# Patient Record
Sex: FEMALE | Race: WHITE | NOT HISPANIC OR LATINO
[De-identification: names, ages, dates, MRNs, and addresses within clinical notes are randomized per-mention and may not be internally consistent; named-entity substitution may affect disease eponyms.]

---

## 2017-01-10 ENCOUNTER — NON-APPOINTMENT (OUTPATIENT)
Age: 82
End: 2017-01-10

## 2017-01-10 ENCOUNTER — APPOINTMENT (OUTPATIENT)
Dept: CARDIOLOGY | Facility: CLINIC | Age: 82
End: 2017-01-10

## 2017-01-10 VITALS — DIASTOLIC BLOOD PRESSURE: 80 MMHG | SYSTOLIC BLOOD PRESSURE: 170 MMHG | HEART RATE: 74 BPM | RESPIRATION RATE: 14 BRPM

## 2017-01-10 VITALS — HEART RATE: 76 BPM | OXYGEN SATURATION: 97 % | DIASTOLIC BLOOD PRESSURE: 65 MMHG | SYSTOLIC BLOOD PRESSURE: 197 MMHG

## 2017-02-01 ENCOUNTER — MEDICATION RENEWAL (OUTPATIENT)
Age: 82
End: 2017-02-01

## 2017-02-21 ENCOUNTER — APPOINTMENT (OUTPATIENT)
Dept: CARDIOLOGY | Facility: CLINIC | Age: 82
End: 2017-02-21

## 2017-02-21 ENCOUNTER — MEDICATION RENEWAL (OUTPATIENT)
Age: 82
End: 2017-02-21

## 2017-02-21 VITALS
SYSTOLIC BLOOD PRESSURE: 193 MMHG | HEIGHT: 62 IN | HEART RATE: 68 BPM | BODY MASS INDEX: 25.21 KG/M2 | OXYGEN SATURATION: 98 % | DIASTOLIC BLOOD PRESSURE: 82 MMHG | WEIGHT: 137 LBS

## 2017-02-21 VITALS — DIASTOLIC BLOOD PRESSURE: 84 MMHG | RESPIRATION RATE: 14 BRPM | HEART RATE: 68 BPM | SYSTOLIC BLOOD PRESSURE: 150 MMHG

## 2017-03-30 ENCOUNTER — APPOINTMENT (OUTPATIENT)
Dept: CARDIOLOGY | Facility: CLINIC | Age: 82
End: 2017-03-30

## 2017-03-30 VITALS
SYSTOLIC BLOOD PRESSURE: 202 MMHG | HEART RATE: 76 BPM | BODY MASS INDEX: 25.21 KG/M2 | OXYGEN SATURATION: 95 % | HEIGHT: 62 IN | DIASTOLIC BLOOD PRESSURE: 81 MMHG | WEIGHT: 137 LBS

## 2017-03-30 VITALS
HEART RATE: 72 BPM | SYSTOLIC BLOOD PRESSURE: 170 MMHG | DIASTOLIC BLOOD PRESSURE: 80 MMHG | RESPIRATION RATE: 14 BRPM | OXYGEN SATURATION: 97 %

## 2017-04-05 LAB
ANION GAP SERPL CALC-SCNC: 16 MMOL/L
BUN SERPL-MCNC: 15 MG/DL
CALCIUM SERPL-MCNC: 9.8 MG/DL
CHLORIDE SERPL-SCNC: 98 MMOL/L
CO2 SERPL-SCNC: 26 MMOL/L
CREAT SERPL-MCNC: 0.78 MG/DL
GLUCOSE SERPL-MCNC: 96 MG/DL
POTASSIUM SERPL-SCNC: 3.9 MMOL/L
SODIUM SERPL-SCNC: 140 MMOL/L

## 2017-04-18 ENCOUNTER — APPOINTMENT (OUTPATIENT)
Dept: CARDIOLOGY | Facility: CLINIC | Age: 82
End: 2017-04-18

## 2017-04-18 VITALS — SYSTOLIC BLOOD PRESSURE: 185 MMHG | DIASTOLIC BLOOD PRESSURE: 73 MMHG | HEART RATE: 81 BPM | OXYGEN SATURATION: 97 %

## 2017-04-18 VITALS
HEART RATE: 76 BPM | DIASTOLIC BLOOD PRESSURE: 84 MMHG | OXYGEN SATURATION: 98 % | RESPIRATION RATE: 14 BRPM | SYSTOLIC BLOOD PRESSURE: 150 MMHG

## 2017-04-24 ENCOUNTER — RX RENEWAL (OUTPATIENT)
Age: 82
End: 2017-04-24

## 2017-05-02 ENCOUNTER — APPOINTMENT (OUTPATIENT)
Dept: CARDIOLOGY | Facility: CLINIC | Age: 82
End: 2017-05-02

## 2017-05-02 VITALS — HEART RATE: 64 BPM | DIASTOLIC BLOOD PRESSURE: 78 MMHG | SYSTOLIC BLOOD PRESSURE: 160 MMHG

## 2017-06-12 ENCOUNTER — RX RENEWAL (OUTPATIENT)
Age: 82
End: 2017-06-12

## 2017-06-22 ENCOUNTER — RX RENEWAL (OUTPATIENT)
Age: 82
End: 2017-06-22

## 2017-06-27 ENCOUNTER — APPOINTMENT (OUTPATIENT)
Dept: CARDIOLOGY | Facility: CLINIC | Age: 82
End: 2017-06-27

## 2017-06-27 VITALS
HEIGHT: 62 IN | BODY MASS INDEX: 25.76 KG/M2 | DIASTOLIC BLOOD PRESSURE: 81 MMHG | HEART RATE: 71 BPM | WEIGHT: 140 LBS | OXYGEN SATURATION: 98 % | SYSTOLIC BLOOD PRESSURE: 165 MMHG

## 2017-07-13 ENCOUNTER — APPOINTMENT (OUTPATIENT)
Dept: CARDIOLOGY | Facility: CLINIC | Age: 82
End: 2017-07-13

## 2017-08-22 ENCOUNTER — RX RENEWAL (OUTPATIENT)
Age: 82
End: 2017-08-22

## 2017-10-17 ENCOUNTER — RX RENEWAL (OUTPATIENT)
Age: 82
End: 2017-10-17

## 2018-02-03 ENCOUNTER — RX RENEWAL (OUTPATIENT)
Age: 83
End: 2018-02-03

## 2018-04-10 ENCOUNTER — RX RENEWAL (OUTPATIENT)
Age: 83
End: 2018-04-10

## 2018-04-25 ENCOUNTER — APPOINTMENT (OUTPATIENT)
Dept: CARDIOLOGY | Facility: CLINIC | Age: 83
End: 2018-04-25
Payer: MEDICARE

## 2018-04-25 VITALS
WEIGHT: 140 LBS | HEART RATE: 68 BPM | OXYGEN SATURATION: 97 % | BODY MASS INDEX: 25.76 KG/M2 | DIASTOLIC BLOOD PRESSURE: 78 MMHG | SYSTOLIC BLOOD PRESSURE: 157 MMHG | HEIGHT: 62 IN

## 2018-04-25 PROCEDURE — 99214 OFFICE O/P EST MOD 30 MIN: CPT

## 2018-05-17 ENCOUNTER — APPOINTMENT (OUTPATIENT)
Dept: CARDIOLOGY | Facility: CLINIC | Age: 83
End: 2018-05-17
Payer: MEDICARE

## 2018-05-17 PROCEDURE — 93880 EXTRACRANIAL BILAT STUDY: CPT

## 2018-06-06 ENCOUNTER — RX RENEWAL (OUTPATIENT)
Age: 83
End: 2018-06-06

## 2018-10-17 ENCOUNTER — APPOINTMENT (OUTPATIENT)
Dept: CARDIOLOGY | Facility: CLINIC | Age: 83
End: 2018-10-17
Payer: MEDICARE

## 2018-10-17 ENCOUNTER — NON-APPOINTMENT (OUTPATIENT)
Age: 83
End: 2018-10-17

## 2018-10-17 VITALS
SYSTOLIC BLOOD PRESSURE: 161 MMHG | HEART RATE: 62 BPM | BODY MASS INDEX: 25.95 KG/M2 | WEIGHT: 141 LBS | DIASTOLIC BLOOD PRESSURE: 80 MMHG | HEIGHT: 62 IN | OXYGEN SATURATION: 97 %

## 2018-10-17 PROCEDURE — 99214 OFFICE O/P EST MOD 30 MIN: CPT

## 2018-10-17 PROCEDURE — 93000 ELECTROCARDIOGRAM COMPLETE: CPT

## 2018-11-12 ENCOUNTER — RX RENEWAL (OUTPATIENT)
Age: 83
End: 2018-11-12

## 2018-12-10 ENCOUNTER — INPATIENT (INPATIENT)
Facility: HOSPITAL | Age: 83
LOS: 2 days | Discharge: HOME | DRG: 202 | End: 2018-12-13
Attending: INTERNAL MEDICINE | Admitting: INTERNAL MEDICINE
Payer: MEDICARE

## 2018-12-10 VITALS
RESPIRATION RATE: 20 BRPM | SYSTOLIC BLOOD PRESSURE: 143 MMHG | TEMPERATURE: 99 F | HEIGHT: 62 IN | HEART RATE: 113 BPM | OXYGEN SATURATION: 91 % | DIASTOLIC BLOOD PRESSURE: 73 MMHG | WEIGHT: 138.01 LBS

## 2018-12-10 DIAGNOSIS — E87.6 HYPOKALEMIA: ICD-10-CM

## 2018-12-10 DIAGNOSIS — E87.1 HYPO-OSMOLALITY AND HYPONATREMIA: ICD-10-CM

## 2018-12-10 DIAGNOSIS — R19.7 DIARRHEA, UNSPECIFIED: ICD-10-CM

## 2018-12-10 DIAGNOSIS — Z29.9 ENCOUNTER FOR PROPHYLACTIC MEASURES, UNSPECIFIED: ICD-10-CM

## 2018-12-10 DIAGNOSIS — J21.0 ACUTE BRONCHIOLITIS DUE TO RESPIRATORY SYNCYTIAL VIRUS: ICD-10-CM

## 2018-12-10 DIAGNOSIS — I10 ESSENTIAL (PRIMARY) HYPERTENSION: ICD-10-CM

## 2018-12-10 DIAGNOSIS — J18.9 PNEUMONIA, UNSPECIFIED ORGANISM: ICD-10-CM

## 2018-12-10 DIAGNOSIS — E78.5 HYPERLIPIDEMIA, UNSPECIFIED: ICD-10-CM

## 2018-12-10 DIAGNOSIS — I48.91 UNSPECIFIED ATRIAL FIBRILLATION: ICD-10-CM

## 2018-12-10 LAB
ALBUMIN SERPL ELPH-MCNC: 3.1 G/DL — LOW (ref 3.3–5)
ALP SERPL-CCNC: 100 U/L — SIGNIFICANT CHANGE UP (ref 40–120)
ALT FLD-CCNC: 34 U/L — SIGNIFICANT CHANGE UP (ref 12–78)
ANION GAP SERPL CALC-SCNC: 9 MMOL/L — SIGNIFICANT CHANGE UP (ref 5–17)
APPEARANCE UR: ABNORMAL
APTT BLD: 41.3 SEC — HIGH (ref 28.5–37)
AST SERPL-CCNC: 56 U/L — HIGH (ref 15–37)
BACTERIA # UR AUTO: ABNORMAL
BASE EXCESS BLDV CALC-SCNC: -1.4 MMOL/L — SIGNIFICANT CHANGE UP (ref -2–2)
BASOPHILS # BLD AUTO: 0.01 K/UL — SIGNIFICANT CHANGE UP (ref 0–0.2)
BASOPHILS NFR BLD AUTO: 0.1 % — SIGNIFICANT CHANGE UP (ref 0–2)
BILIRUB SERPL-MCNC: 1.5 MG/DL — HIGH (ref 0.2–1.2)
BILIRUB UR-MCNC: ABNORMAL
BLOOD GAS COMMENTS, VENOUS: SIGNIFICANT CHANGE UP
BLOOD GAS COMMENTS, VENOUS: SIGNIFICANT CHANGE UP
BUN SERPL-MCNC: 24 MG/DL — HIGH (ref 7–23)
CALCIUM SERPL-MCNC: 8.2 MG/DL — LOW (ref 8.5–10.1)
CHLORIDE SERPL-SCNC: 87 MMOL/L — LOW (ref 96–108)
CO2 SERPL-SCNC: 29 MMOL/L — SIGNIFICANT CHANGE UP (ref 22–31)
COLOR SPEC: YELLOW — SIGNIFICANT CHANGE UP
COMMENT - URINE: SIGNIFICANT CHANGE UP
COMMENT - URINE: SIGNIFICANT CHANGE UP
CREAT SERPL-MCNC: 0.96 MG/DL — SIGNIFICANT CHANGE UP (ref 0.5–1.3)
DIFF PNL FLD: ABNORMAL
EOSINOPHIL # BLD AUTO: 0 K/UL — SIGNIFICANT CHANGE UP (ref 0–0.5)
EOSINOPHIL NFR BLD AUTO: 0 % — SIGNIFICANT CHANGE UP (ref 0–6)
EPI CELLS # UR: SIGNIFICANT CHANGE UP
GLUCOSE SERPL-MCNC: 116 MG/DL — HIGH (ref 70–99)
GLUCOSE UR QL: NEGATIVE — SIGNIFICANT CHANGE UP
GRAN CASTS # UR COMP ASSIST: ABNORMAL /LPF
HCO3 BLDV-SCNC: 23 MMOL/L — SIGNIFICANT CHANGE UP (ref 21–29)
HCT VFR BLD CALC: 36.6 % — SIGNIFICANT CHANGE UP (ref 34.5–45)
HGB BLD-MCNC: 12.7 G/DL — SIGNIFICANT CHANGE UP (ref 11.5–15.5)
HOROWITZ INDEX BLDV+IHG-RTO: 21 — SIGNIFICANT CHANGE UP
IMM GRANULOCYTES NFR BLD AUTO: 0.5 % — SIGNIFICANT CHANGE UP (ref 0–1.5)
INR BLD: 2.19 RATIO — HIGH (ref 0.88–1.16)
KETONES UR-MCNC: ABNORMAL
LACTATE SERPL-SCNC: 2.2 MMOL/L — HIGH (ref 0.7–2)
LACTATE SERPL-SCNC: 2.3 MMOL/L — HIGH (ref 0.7–2)
LEUKOCYTE ESTERASE UR-ACNC: NEGATIVE — SIGNIFICANT CHANGE UP
LYMPHOCYTES # BLD AUTO: 0.76 K/UL — LOW (ref 1–3.3)
LYMPHOCYTES # BLD AUTO: 7.9 % — LOW (ref 13–44)
MCHC RBC-ENTMCNC: 29.1 PG — SIGNIFICANT CHANGE UP (ref 27–34)
MCHC RBC-ENTMCNC: 34.7 GM/DL — SIGNIFICANT CHANGE UP (ref 32–36)
MCV RBC AUTO: 83.9 FL — SIGNIFICANT CHANGE UP (ref 80–100)
MONOCYTES # BLD AUTO: 0.61 K/UL — SIGNIFICANT CHANGE UP (ref 0–0.9)
MONOCYTES NFR BLD AUTO: 6.3 % — SIGNIFICANT CHANGE UP (ref 2–14)
NEUTROPHILS # BLD AUTO: 8.23 K/UL — HIGH (ref 1.8–7.4)
NEUTROPHILS NFR BLD AUTO: 85.2 % — HIGH (ref 43–77)
NITRITE UR-MCNC: NEGATIVE — SIGNIFICANT CHANGE UP
NRBC # BLD: 0 /100 WBCS — SIGNIFICANT CHANGE UP (ref 0–0)
NT-PROBNP SERPL-SCNC: 3705 PG/ML — HIGH (ref 0–450)
PCO2 BLDV: 44 MMHG — SIGNIFICANT CHANGE UP (ref 35–50)
PH BLDV: 7.35 — SIGNIFICANT CHANGE UP (ref 7.35–7.45)
PH UR: 5 — SIGNIFICANT CHANGE UP (ref 5–8)
PLATELET # BLD AUTO: 191 K/UL — SIGNIFICANT CHANGE UP (ref 150–400)
PO2 BLDV: 112 MMHG — HIGH (ref 25–45)
POTASSIUM SERPL-MCNC: 2.9 MMOL/L — CRITICAL LOW (ref 3.5–5.3)
POTASSIUM SERPL-SCNC: 2.9 MMOL/L — CRITICAL LOW (ref 3.5–5.3)
PROT SERPL-MCNC: 7.2 G/DL — SIGNIFICANT CHANGE UP (ref 6–8.3)
PROT UR-MCNC: 75 MG/DL
PROTHROM AB SERPL-ACNC: 25.6 SEC — HIGH (ref 10–12.9)
RAPID RVP RESULT: DETECTED
RBC # BLD: 4.36 M/UL — SIGNIFICANT CHANGE UP (ref 3.8–5.2)
RBC # FLD: 13 % — SIGNIFICANT CHANGE UP (ref 10.3–14.5)
RBC CASTS # UR COMP ASSIST: ABNORMAL /HPF (ref 0–4)
RSV RNA SPEC QL NAA+PROBE: DETECTED
SAO2 % BLDV: 99 % — HIGH (ref 67–88)
SODIUM SERPL-SCNC: 125 MMOL/L — LOW (ref 135–145)
SP GR SPEC: 1.02 — SIGNIFICANT CHANGE UP (ref 1.01–1.02)
TSH SERPL-MCNC: 0.42 UIU/ML — SIGNIFICANT CHANGE UP (ref 0.36–3.74)
UROBILINOGEN FLD QL: 4
WBC # BLD: 9.66 K/UL — SIGNIFICANT CHANGE UP (ref 3.8–10.5)
WBC # FLD AUTO: 9.66 K/UL — SIGNIFICANT CHANGE UP (ref 3.8–10.5)
WBC UR QL: SIGNIFICANT CHANGE UP

## 2018-12-10 PROCEDURE — 71045 X-RAY EXAM CHEST 1 VIEW: CPT | Mod: 26

## 2018-12-10 PROCEDURE — 93010 ELECTROCARDIOGRAM REPORT: CPT

## 2018-12-10 PROCEDURE — 99285 EMERGENCY DEPT VISIT HI MDM: CPT

## 2018-12-10 RX ORDER — LOSARTAN POTASSIUM 100 MG/1
25 TABLET, FILM COATED ORAL DAILY
Qty: 0 | Refills: 0 | Status: DISCONTINUED | OUTPATIENT
Start: 2018-12-10 | End: 2018-12-13

## 2018-12-10 RX ORDER — LANOLIN ALCOHOL/MO/W.PET/CERES
5 CREAM (GRAM) TOPICAL ONCE
Qty: 0 | Refills: 0 | Status: COMPLETED | OUTPATIENT
Start: 2018-12-10 | End: 2018-12-10

## 2018-12-10 RX ORDER — CEFTRIAXONE 500 MG/1
1 INJECTION, POWDER, FOR SOLUTION INTRAMUSCULAR; INTRAVENOUS ONCE
Qty: 0 | Refills: 0 | Status: COMPLETED | OUTPATIENT
Start: 2018-12-10 | End: 2018-12-10

## 2018-12-10 RX ORDER — LOSARTAN POTASSIUM 100 MG/1
1 TABLET, FILM COATED ORAL
Qty: 0 | Refills: 0 | COMMUNITY

## 2018-12-10 RX ORDER — MULTIVIT-MIN/FERROUS GLUCONATE 9 MG/15 ML
1 LIQUID (ML) ORAL
Qty: 0 | Refills: 0 | COMMUNITY

## 2018-12-10 RX ORDER — CHOLECALCIFEROL (VITAMIN D3) 125 MCG
1 CAPSULE ORAL
Qty: 0 | Refills: 0 | COMMUNITY

## 2018-12-10 RX ORDER — ATORVASTATIN CALCIUM 80 MG/1
20 TABLET, FILM COATED ORAL AT BEDTIME
Qty: 0 | Refills: 0 | Status: DISCONTINUED | OUTPATIENT
Start: 2018-12-10 | End: 2018-12-13

## 2018-12-10 RX ORDER — IPRATROPIUM/ALBUTEROL SULFATE 18-103MCG
3 AEROSOL WITH ADAPTER (GRAM) INHALATION ONCE
Qty: 0 | Refills: 0 | Status: COMPLETED | OUTPATIENT
Start: 2018-12-10 | End: 2018-12-10

## 2018-12-10 RX ORDER — APIXABAN 2.5 MG/1
1 TABLET, FILM COATED ORAL
Qty: 0 | Refills: 0 | COMMUNITY

## 2018-12-10 RX ORDER — SODIUM CHLORIDE 9 MG/ML
1000 INJECTION INTRAMUSCULAR; INTRAVENOUS; SUBCUTANEOUS ONCE
Qty: 0 | Refills: 0 | Status: COMPLETED | OUTPATIENT
Start: 2018-12-10 | End: 2018-12-10

## 2018-12-10 RX ORDER — HYDROCHLOROTHIAZIDE 25 MG
25 TABLET ORAL DAILY
Qty: 0 | Refills: 0 | Status: DISCONTINUED | OUTPATIENT
Start: 2018-12-10 | End: 2018-12-10

## 2018-12-10 RX ORDER — ACETAMINOPHEN 500 MG
650 TABLET ORAL EVERY 6 HOURS
Qty: 0 | Refills: 0 | Status: DISCONTINUED | OUTPATIENT
Start: 2018-12-10 | End: 2018-12-13

## 2018-12-10 RX ORDER — LACTOBACILLUS ACIDOPHILUS 100MM CELL
1 CAPSULE ORAL
Qty: 0 | Refills: 0 | Status: DISCONTINUED | OUTPATIENT
Start: 2018-12-10 | End: 2018-12-13

## 2018-12-10 RX ORDER — CEFTRIAXONE 500 MG/1
1 INJECTION, POWDER, FOR SOLUTION INTRAMUSCULAR; INTRAVENOUS EVERY 24 HOURS
Qty: 0 | Refills: 0 | Status: DISCONTINUED | OUTPATIENT
Start: 2018-12-11 | End: 2018-12-13

## 2018-12-10 RX ORDER — AZITHROMYCIN 500 MG/1
500 TABLET, FILM COATED ORAL ONCE
Qty: 0 | Refills: 0 | Status: COMPLETED | OUTPATIENT
Start: 2018-12-10 | End: 2018-12-10

## 2018-12-10 RX ORDER — DILTIAZEM HCL 120 MG
120 CAPSULE, EXT RELEASE 24 HR ORAL DAILY
Qty: 0 | Refills: 0 | Status: DISCONTINUED | OUTPATIENT
Start: 2018-12-10 | End: 2018-12-13

## 2018-12-10 RX ORDER — METOPROLOL TARTRATE 50 MG
50 TABLET ORAL
Qty: 0 | Refills: 0 | Status: DISCONTINUED | OUTPATIENT
Start: 2018-12-10 | End: 2018-12-13

## 2018-12-10 RX ORDER — METOPROLOL TARTRATE 50 MG
1 TABLET ORAL
Qty: 0 | Refills: 0 | COMMUNITY

## 2018-12-10 RX ORDER — AZITHROMYCIN 500 MG/1
500 TABLET, FILM COATED ORAL EVERY 24 HOURS
Qty: 0 | Refills: 0 | Status: DISCONTINUED | OUTPATIENT
Start: 2018-12-11 | End: 2018-12-13

## 2018-12-10 RX ORDER — ALBUTEROL 90 UG/1
2.5 AEROSOL, METERED ORAL
Qty: 0 | Refills: 0 | Status: DISCONTINUED | OUTPATIENT
Start: 2018-12-10 | End: 2018-12-13

## 2018-12-10 RX ORDER — ALBUTEROL 90 UG/1
2.5 AEROSOL, METERED ORAL EVERY 8 HOURS
Qty: 0 | Refills: 0 | Status: DISCONTINUED | OUTPATIENT
Start: 2018-12-10 | End: 2018-12-13

## 2018-12-10 RX ORDER — ATORVASTATIN CALCIUM 80 MG/1
1 TABLET, FILM COATED ORAL
Qty: 0 | Refills: 0 | COMMUNITY

## 2018-12-10 RX ORDER — APIXABAN 2.5 MG/1
5 TABLET, FILM COATED ORAL EVERY 12 HOURS
Qty: 0 | Refills: 0 | Status: DISCONTINUED | OUTPATIENT
Start: 2018-12-10 | End: 2018-12-13

## 2018-12-10 RX ORDER — BUDESONIDE AND FORMOTEROL FUMARATE DIHYDRATE 160; 4.5 UG/1; UG/1
2 AEROSOL RESPIRATORY (INHALATION)
Qty: 0 | Refills: 0 | Status: DISCONTINUED | OUTPATIENT
Start: 2018-12-10 | End: 2018-12-13

## 2018-12-10 RX ORDER — DILTIAZEM HCL 120 MG
1 CAPSULE, EXT RELEASE 24 HR ORAL
Qty: 0 | Refills: 0 | COMMUNITY

## 2018-12-10 RX ORDER — POTASSIUM CHLORIDE 20 MEQ
40 PACKET (EA) ORAL ONCE
Qty: 0 | Refills: 0 | Status: COMPLETED | OUTPATIENT
Start: 2018-12-10 | End: 2018-12-10

## 2018-12-10 RX ORDER — SODIUM CHLORIDE 9 MG/ML
1000 INJECTION INTRAMUSCULAR; INTRAVENOUS; SUBCUTANEOUS
Qty: 0 | Refills: 0 | Status: DISCONTINUED | OUTPATIENT
Start: 2018-12-10 | End: 2018-12-11

## 2018-12-10 RX ADMIN — Medication 1 TABLET(S): at 18:36

## 2018-12-10 RX ADMIN — APIXABAN 5 MILLIGRAM(S): 2.5 TABLET, FILM COATED ORAL at 18:35

## 2018-12-10 RX ADMIN — Medication 40 MILLIEQUIVALENT(S): at 12:05

## 2018-12-10 RX ADMIN — ATORVASTATIN CALCIUM 20 MILLIGRAM(S): 80 TABLET, FILM COATED ORAL at 21:44

## 2018-12-10 RX ADMIN — ALBUTEROL 2.5 MILLIGRAM(S): 90 AEROSOL, METERED ORAL at 15:31

## 2018-12-10 RX ADMIN — SODIUM CHLORIDE 1000 MILLILITER(S): 9 INJECTION INTRAMUSCULAR; INTRAVENOUS; SUBCUTANEOUS at 15:45

## 2018-12-10 RX ADMIN — CEFTRIAXONE 1 GRAM(S): 500 INJECTION, POWDER, FOR SOLUTION INTRAMUSCULAR; INTRAVENOUS at 12:35

## 2018-12-10 RX ADMIN — BUDESONIDE AND FORMOTEROL FUMARATE DIHYDRATE 2 PUFF(S): 160; 4.5 AEROSOL RESPIRATORY (INHALATION) at 18:36

## 2018-12-10 RX ADMIN — AZITHROMYCIN 255 MILLIGRAM(S): 500 TABLET, FILM COATED ORAL at 13:00

## 2018-12-10 RX ADMIN — SODIUM CHLORIDE 1000 MILLILITER(S): 9 INJECTION INTRAMUSCULAR; INTRAVENOUS; SUBCUTANEOUS at 11:12

## 2018-12-10 RX ADMIN — SODIUM CHLORIDE 1000 MILLILITER(S): 9 INJECTION INTRAMUSCULAR; INTRAVENOUS; SUBCUTANEOUS at 12:12

## 2018-12-10 RX ADMIN — Medication 120 MILLIGRAM(S): at 18:35

## 2018-12-10 RX ADMIN — SODIUM CHLORIDE 1000 MILLILITER(S): 9 INJECTION INTRAMUSCULAR; INTRAVENOUS; SUBCUTANEOUS at 12:06

## 2018-12-10 RX ADMIN — Medication 5 MILLIGRAM(S): at 21:44

## 2018-12-10 RX ADMIN — Medication 20 MILLIGRAM(S): at 21:44

## 2018-12-10 RX ADMIN — ALBUTEROL 2.5 MILLIGRAM(S): 90 AEROSOL, METERED ORAL at 23:13

## 2018-12-10 RX ADMIN — Medication 3 MILLILITER(S): at 13:01

## 2018-12-10 RX ADMIN — Medication 200 MILLIGRAM(S): at 21:44

## 2018-12-10 RX ADMIN — SODIUM CHLORIDE 75 MILLILITER(S): 9 INJECTION INTRAMUSCULAR; INTRAVENOUS; SUBCUTANEOUS at 17:07

## 2018-12-10 RX ADMIN — Medication 50 MILLIGRAM(S): at 18:35

## 2018-12-10 RX ADMIN — Medication 3 MILLILITER(S): at 11:21

## 2018-12-10 RX ADMIN — CEFTRIAXONE 100 GRAM(S): 500 INJECTION, POWDER, FOR SOLUTION INTRAMUSCULAR; INTRAVENOUS at 12:05

## 2018-12-10 RX ADMIN — Medication 650 MILLIGRAM(S): at 16:06

## 2018-12-10 RX ADMIN — Medication 650 MILLIGRAM(S): at 16:36

## 2018-12-10 RX ADMIN — Medication 100 MILLIGRAM(S): at 16:06

## 2018-12-10 NOTE — ED ADULT TRIAGE NOTE - CHIEF COMPLAINT QUOTE
pt presents to er c/o [facility], fever and not feeling well since friday, went to urgent care and was prescribed meds with no improvements

## 2018-12-10 NOTE — H&P ADULT - FAMILY HISTORY
Father  Still living? Unknown  Family history of myocardial infarction, Age at diagnosis: Age Unknown  Family history of abdominal aortic aneurysm (AAA), Age at diagnosis: Age Unknown     Mother  Still living? Unknown  Family history of myocardial infarction, Age at diagnosis: Age Unknown     Sibling  Still living? Unknown  Family history of type 2 diabetes mellitus, Age at diagnosis: Age Unknown

## 2018-12-10 NOTE — H&P ADULT - NSHPREVIEWOFSYSTEMS_GEN_ALL_CORE
Constitutional: (+) fever, chills, no sweating  HEENT: denies headache, dizziness, or lightheadedness  Respiratory: denies SOB, admits (+) cough, no wheezing  Cardiovascular: denies CP, palpitations  Gastrointestinal: denies nausea, vomiting, admits (+) diarrhea, no constipation, abdominal pain, or bloody stools  Genitourinary: denies painful urination, increased frequency, urgency, or bloody urine  Skin/Breast: denies rashes or itching  Musculoskeletal: denies muscle aches, joint swelling, or muscle weakness  Neurologic: denies loss of sensation, numbness, or tingling  ROS negative except as noted above

## 2018-12-10 NOTE — ED ADULT NURSE NOTE - NSIMPLEMENTINTERV_GEN_ALL_ED
Implemented All Universal Safety Interventions:  [place] to call system. Call bell, personal items and telephone within reach. Instruct patient to call for assistance. Room bathroom lighting operational. Non-slip footwear when patient is off stretcher. Physically safe environment: no spills, clutter or unnecessary equipment. Stretcher in lowest position, wheels locked, appropriate side rails in place.

## 2018-12-10 NOTE — H&P ADULT - PROBLEM SELECTOR PLAN 1
admit to [facility]  - likely 2/2 viral infection with RVP showing RSV (+), with GI symptoms  - CXR showing multifocal PNA  - pt with diffuse rhonchi R Lung fields  - pulm consulted (Dr. Park), recs c/w emp ABX - rocephin and zithro  - Albuterol NEBS TID atc and PRN,   - cough rx regimen - robitussin, tessalon perles  - symbicort and prednisone PO   - check MRSA screen, f/u AM CBC, CMP  - f/u rpt lactate  - droplet precautions admit to [facility]  - likely 2/2 viral infection with RVP showing RSV (+), with GI symptoms  - CXR showing multifocal PNA  - pt with diffuse rhonchi R Lung fields, diarrhea  - f/u urine Legionella Ag  - pulm consulted (Dr. Park), recs c/w emp ABX - rocephin and zithro  - Albuterol NEBS TID atc and PRN,   - cough rx regimen - robitussin, tessalon perles  - symbicort and prednisone PO   - check MRSA screen, f/u AM CBC, CMP  - f/u rpt lactate  - droplet precautions

## 2018-12-10 NOTE — ED PROVIDER NOTE - OBJECTIVE STATEMENT
pt c/o 5 days of sob, weakness, nonproductive cough. pt seen at urgent care 3 days ago, had temp 101.8, no fever, since. no ha, cp, abd pain, vomiting, edema.  pmd - gerardo

## 2018-12-10 NOTE — GOALS OF CARE CONVERSATION - PERSONAL ADVANCE DIRECTIVE - CONVERSATION DETAILS
Pt completed HCP. Educated her regarding notification of agents and discussions to have. She also understands resuscitation and does want an attempt at CPR at this time

## 2018-12-10 NOTE — H&P ADULT - ASSESSMENT
Pt is a 83 y/o F with PMHx of HTN, HLD, Afib (AC on Eliquis) who presents from home with 5 days of sob, weakness, nonproductive cough, and diarrhea since Saturday, admitted for PNA.

## 2018-12-10 NOTE — CONSULT NOTE ADULT - SUBJECTIVE AND OBJECTIVE BOX
Date/Time Patient Seen:  		  Referring MD:   Data Reviewed	       Patient is a 84y old  Female who presents with a chief complaint of PNA (10 Dec 2018 13:28)      Subjective/HPI    in bed  seen and examined  vs and meds reviewed, er provider note reviewed and appreciated, H and P reviewed, labs and imaging reviewed, cxr noted, shows BL PNA, reviewed with Dtr  sees Dr. Lake, cough, SOB, ARAUZ, sx since Friday       H&P Adult [Charted Location: [facility]] [Authored: 10-Dec-2018 13:28]- for Visit: [id number], Incomplete, Not Revised, Signed in Full, General    History and Physical:   Source of Information	Patient  Outpatient Providers	PMD: Dr. Lake     Language:  · Patient/Family of Limited English Proficiency	No       History of Present Illness:  Reason for Admission: PNA  History of Present Illness:    Pt is a 83 y/o F with PMHx of  __________ who presents from __________ with 5 days of sob, weakness, nonproductive cough. pt seen at urgent care 3 days ago, had temp 101.8, no fever, since. no ha, cp, abd pain, vomiting, edema.    In the ED: VS T 98.7, , /73, RR 20, 91%RA. CBC WNL. CMP Na: 125, K 2.9, Cl 87, BUN 24, Bili 1.5, Lactate 2.2, RVP: RSV (+), CXR showing multifocal PNA. Pt was given 2x dose DUONEB, 1x dose azithro, 1x dose rocephin, 2L NS Bolus, 1x dose KCl 40meq. BCx sent.       ED Provider Note [Charted Location: [facility] ED] [Authored: 10-Dec-2018 10:48]- for Visit: [id number], Complete, Revised, Signed in Full, General    HISTORY OF PRESENT ILLNESS:    High Risk Travel:  International Travel? No(1)    · Chief Complaint: The patient is a 84y Female complaining of cough.  · HPI Objective Statement: pt c/o 5 days of sob, weakness, nonproductive cough. pt seen at urgent care 3 days ago, had temp 101.8, no fever, since. no ha, cp, abd pain, vomiting, edema.  pmd - baktidy  · Presenting Symptoms: COUGH, FEVER, SHORTNESS OF BREATH  · Negative Findings: no chest pain, no edema, no headache  · Highest Temperature: fahrenheit  101.8  · Timing: constant  · Duration: day(s)  5  · Quality: alteration in breathing pattern, non-productive cough  · Severity: moderate  · Context: unknown  · Recent Exposure To: none known        PAST MEDICAL & SURGICAL HISTORY:        Medication list         MEDICATIONS  (STANDING):  ALBUTerol    0.083% 2.5 milliGRAM(s) Nebulizer every 8 hours  buDESOnide  80 MICROgram(s)/formoterol 4.5 MICROgram(s) Inhaler 2 Puff(s) Inhalation two times a day  predniSONE   Tablet 20 milliGRAM(s) Oral three times a day    MEDICATIONS  (PRN):  acetaminophen   Tablet .. 650 milliGRAM(s) Oral every 6 hours PRN Temp greater or equal to 38C (100.4F), Mild Pain (1 - 3)  ALBUTerol    0.083% 2.5 milliGRAM(s) Nebulizer every 3 hours PRN Shortness of Breath and/or Wheezing  benzonatate 100 milliGRAM(s) Oral three times a day PRN Cough  guaiFENesin   Syrup  (Sugar-Free) 200 milliGRAM(s) Oral every 6 hours PRN Cough         Vitals log        ICU Vital Signs Last 24 Hrs  T(C): 37 (10 Dec 2018 10:46), Max: 37.1 (10 Dec 2018 10:25)  T(F): 98.6 (10 Dec 2018 10:46), Max: 98.7 (10 Dec 2018 10:25)  HR: 107 (10 Dec 2018 10:46) (107 - 113)  BP: 143/73 (10 Dec 2018 10:25) (143/73 - 143/73)  BP(mean): --  ABP: --  ABP(mean): --  RR: 20 (10 Dec 2018 10:46) (20 - 20)  SpO2: 97% (10 Dec 2018 10:46) (91% - 97%)           Input and Output:  I&O's Detail      Lab Data                        12.7   9.66  )-----------( 191      ( 10 Dec 2018 11:13 )             36.6     12-10    125<L>  |  87<L>  |  24<H>  ----------------------------<  116<H>  2.9<LL>   |  29  |  0.96    Ca    8.2<L>      10 Dec 2018 11:13    TPro  7.2  /  Alb  3.1<L>  /  TBili  1.5<H>  /  DBili  x   /  AST  56<H>  /  ALT  34  /  AlkPhos  100  12-10      non smoker  retired  alert  oriented        Review of Systems	  cough  weakness      Objective     Physical Examination    rhonchi  abd soft  cn grossly int  lung dec BS      Pertinent Lab findings & Imaging      Lynn:  NO   Adequate UO     I&O's Detail           Discussed with:     Cultures:	        Radiology            EXAM:  XR CHEST AP OR PA 1V                            PROCEDURE DATE:  12/10/2018          INTERPRETATION:  Clinical information: Cough.    Technique: Frontal view of the chest.    Comparison: None available.    Findings: Patchy bibasilar opacities are noted. There is bony vascular   congestion. The heart size is normal. There are mild multilevel   degenerative changes of the thoracic spine.    IMPRESSION: Multifocal pneumonia. Recommend imaging follow-up to   resolution.                KENRICK CALHOUN M.D., ATTENDING RADIOLOGIST  This document has been electronically signed. Dec 10 2018 12:30PM

## 2018-12-10 NOTE — CONSULT NOTE ADULT - PROBLEM SELECTOR RECOMMENDATION 9
RSV - Bronchiolitis, PNA, resp viral infection, lower resp tract infection  emp ABX - rocephin and zithro  Albuterol NEBS TID atc and PRN, cough rx regimen - robitussin, tessalon perles  symbicort and prednisone PO   check MRSA screen  serial labs  serial PE  lactic acid repeat  discussed RSV infection, complications and course, warned pt that cough and weakness may linger for days to weeks  discussed CXR and condition with daughter  will follow and monitor

## 2018-12-10 NOTE — ED ADULT NURSE NOTE - OBJECTIVE STATEMENT
83 y/o female presents to ED with productive cough with yellow sputum, intermittent fever, SOB, weakness and poor appetite x 5 days. Seen at urgent care 3 days ago and had negative flu swab. Denies recent travel. Denies pain. Skin warm, dry and appropriate color for ethnicity. Capillary refill less than 3 seconds.

## 2018-12-10 NOTE — H&P ADULT - PROBLEM SELECTOR PLAN 5
likely 2/2 GI losses from diarrhea  - K 2.9, given KCl 40meq in ED.  - give additional dose of 40meq KCl  - f/u AM CMP

## 2018-12-10 NOTE — ED ADULT NURSE NOTE - CHIEF COMPLAINT QUOTE
pt presents to er c/o [facility], fever and not feeling well since friday, went to urgent care and was prescribed meds with no improvements

## 2018-12-10 NOTE — H&P ADULT - PROBLEM SELECTOR PLAN 3
likely 2/2 viral illness  - pt with 5-10 episode of loose stools per day  - start maintenance IVF NS@125cc/hr  - kaopectate q4h PRN likely 2/2 viral illness  - pt with 5-10 episode of loose stools per day  - start maintenance IVF NS@75cc/hr  - kaopectate q4h PRN

## 2018-12-10 NOTE — ED PROVIDER NOTE - MEDICAL DECISION MAKING DETAILS
pt with fever, cough, sob, wheezing, fatigue - ekg/xr/labs/neb/pulm pt with fever, cough, sob, wheezing, fatigue - ekg/xr/labs/neb/pulm/abx

## 2018-12-10 NOTE — H&P ADULT - PROBLEM SELECTOR PLAN 7
chronic, stable  - c/w with home HCTZ, diltiazem, losartan, and Toprol XL chronic, stable  - c/w with home diltiazem, losartan, and Toprol XL  - holding pts home HCTZ due to hyponatremia

## 2018-12-10 NOTE — H&P ADULT - PROBLEM SELECTOR PLAN 4
likely 2/2 GI losses from diarrhea  - Na 125, given 2L NS bolus in ED.  - c/w IVF, f/u AM CMP likely 2/2 GI losses from diarrhea  - Na 125, given 2L NS bolus in ED.  - c/w IVF, f/u AM CMP  - Hold HCTZ  - renal consulted (Dr. Ferrer), awaiting recs

## 2018-12-10 NOTE — H&P ADULT - HISTORY OF PRESENT ILLNESS
Pt is a 83 y/o F with PMHx of  __________ who presents from __________ with 5 days of sob, weakness, nonproductive cough. pt seen at urgent care 3 days ago, had temp 101.8, no fever, since. no ha, cp, abd pain, vomiting, edema.    In the ED: VS T 98.7, , /73, RR 20, 91%RA. CBC WNL. CMP Na: 125, K 2.9, Cl 87, BUN 24, Bili 1.5, Lactate 2.2, RVP: RSV (+), CXR showing multifocal PNA. Pt was given 2x dose DUONEB, 1x dose azithro, 1x dose rocephin, 2L NS Bolus, 1x dose KCl 40meq. BCx sent. Pt is a 83 y/o F with PMHx of HTN, HLD, Afib (AC on Eliquis) who presents from home with 5 days of sob, weakness, nonproductive cough, and diarrhea since Saturday. Pt states she had cold-like symptoms with a dry cough starting Wednesday which developed into a fever and chills by Friday. Pt was then seen at urgent care Friday, with a temperature of 101.8. Pt was discharged with a viral illness, and given albuterol, hydroxyzine, benzonate, and an oral lidocaine viscous solution. Pt then developed diarrhea on Saturday with worsening productive cough with greenish sputum. Pt reports 5-10 episodes of diarrhea per day since Saturday. Admits to decreased appetite as well. Pt denies any current HA, CP, SOB, N/V.     In the ED: VS T 98.7, , /73, RR 20, 91%RA. CBC WNL. CMP Na: 125, K 2.9, Cl 87, BUN 24, Bili 1.5, Lactate 2.2, RVP: RSV (+), CXR showing multifocal PNA. Pt was given 2x dose DUONEB, 1x dose azithro, 1x dose rocephin, 2L NS Bolus, 1x dose KCl 40meq. BCx sent.

## 2018-12-10 NOTE — H&P ADULT - NSHPPHYSICALEXAM_GEN_ALL_CORE
Physical Exam:  General: tired appearing female  HEENT: NC/AT, PERRLA, EOMI B/L, dry mucous membranes   Neck: Supple, nontender, no masses  CV: RRR, +S1/S2, no murmurs, rubs or gallops  Respiratory: diffuse rhonchi RL fields, coarse BS L Lung fields  Abdominal: Soft, NT, ND +BSx4  Extremities: No C/C/E, + peripheral pulses  Neurology: AAOx3, nonfocal, CN II-XII grossly intact, sensation intact

## 2018-12-10 NOTE — H&P ADULT - NSHPSOCIALHISTORY_GEN_ALL_CORE
Lives alone  Able to carry out ADL's independently  Ambulates independently  Social drinker, nonsmoker

## 2018-12-11 DIAGNOSIS — Z71.89 OTHER SPECIFIED COUNSELING: ICD-10-CM

## 2018-12-11 LAB
ALBUMIN SERPL ELPH-MCNC: 2.3 G/DL — LOW (ref 3.3–5)
ALP SERPL-CCNC: 86 U/L — SIGNIFICANT CHANGE UP (ref 40–120)
ALT FLD-CCNC: 31 U/L — SIGNIFICANT CHANGE UP (ref 12–78)
ANION GAP SERPL CALC-SCNC: 8 MMOL/L — SIGNIFICANT CHANGE UP (ref 5–17)
AST SERPL-CCNC: 48 U/L — HIGH (ref 15–37)
BASOPHILS # BLD AUTO: 0 K/UL — SIGNIFICANT CHANGE UP (ref 0–0.2)
BASOPHILS NFR BLD AUTO: 0 % — SIGNIFICANT CHANGE UP (ref 0–2)
BILIRUB SERPL-MCNC: 1 MG/DL — SIGNIFICANT CHANGE UP (ref 0.2–1.2)
BUN SERPL-MCNC: 15 MG/DL — SIGNIFICANT CHANGE UP (ref 7–23)
CALCIUM SERPL-MCNC: 7.8 MG/DL — LOW (ref 8.5–10.1)
CHLORIDE SERPL-SCNC: 96 MMOL/L — SIGNIFICANT CHANGE UP (ref 96–108)
CO2 SERPL-SCNC: 29 MMOL/L — SIGNIFICANT CHANGE UP (ref 22–31)
CREAT SERPL-MCNC: 0.66 MG/DL — SIGNIFICANT CHANGE UP (ref 0.5–1.3)
CRP SERPL-MCNC: 25 MG/DL — HIGH (ref 0–0.4)
CULTURE RESULTS: NO GROWTH — SIGNIFICANT CHANGE UP
EOSINOPHIL # BLD AUTO: 0 K/UL — SIGNIFICANT CHANGE UP (ref 0–0.5)
EOSINOPHIL NFR BLD AUTO: 0 % — SIGNIFICANT CHANGE UP (ref 0–6)
GLUCOSE SERPL-MCNC: 127 MG/DL — HIGH (ref 70–99)
HCT VFR BLD CALC: 31.3 % — LOW (ref 34.5–45)
HGB BLD-MCNC: 10.8 G/DL — LOW (ref 11.5–15.5)
LYMPHOCYTES # BLD AUTO: 0.52 K/UL — LOW (ref 1–3.3)
LYMPHOCYTES # BLD AUTO: 7 % — LOW (ref 13–44)
MCHC RBC-ENTMCNC: 29.1 PG — SIGNIFICANT CHANGE UP (ref 27–34)
MCHC RBC-ENTMCNC: 34.5 GM/DL — SIGNIFICANT CHANGE UP (ref 32–36)
MCV RBC AUTO: 84.4 FL — SIGNIFICANT CHANGE UP (ref 80–100)
MONOCYTES # BLD AUTO: 0.15 K/UL — SIGNIFICANT CHANGE UP (ref 0–0.9)
MONOCYTES NFR BLD AUTO: 2 % — SIGNIFICANT CHANGE UP (ref 2–14)
NEUTROPHILS # BLD AUTO: 6.7 K/UL — SIGNIFICANT CHANGE UP (ref 1.8–7.4)
NEUTROPHILS NFR BLD AUTO: 84 % — HIGH (ref 43–77)
PLATELET # BLD AUTO: 169 K/UL — SIGNIFICANT CHANGE UP (ref 150–400)
POTASSIUM SERPL-MCNC: 3.5 MMOL/L — SIGNIFICANT CHANGE UP (ref 3.5–5.3)
POTASSIUM SERPL-SCNC: 3.5 MMOL/L — SIGNIFICANT CHANGE UP (ref 3.5–5.3)
PROT SERPL-MCNC: 5.8 G/DL — LOW (ref 6–8.3)
RBC # BLD: 3.71 M/UL — LOW (ref 3.8–5.2)
RBC # FLD: 13 % — SIGNIFICANT CHANGE UP (ref 10.3–14.5)
SODIUM SERPL-SCNC: 133 MMOL/L — LOW (ref 135–145)
SPECIMEN SOURCE: SIGNIFICANT CHANGE UP
WBC # BLD: 7.36 K/UL — SIGNIFICANT CHANGE UP (ref 3.8–10.5)
WBC # FLD AUTO: 7.36 K/UL — SIGNIFICANT CHANGE UP (ref 3.8–10.5)

## 2018-12-11 RX ADMIN — Medication 20 MILLIGRAM(S): at 06:35

## 2018-12-11 RX ADMIN — Medication 20 MILLIGRAM(S): at 21:42

## 2018-12-11 RX ADMIN — BUDESONIDE AND FORMOTEROL FUMARATE DIHYDRATE 2 PUFF(S): 160; 4.5 AEROSOL RESPIRATORY (INHALATION) at 21:42

## 2018-12-11 RX ADMIN — AZITHROMYCIN 255 MILLIGRAM(S): 500 TABLET, FILM COATED ORAL at 12:15

## 2018-12-11 RX ADMIN — Medication 50 MILLIGRAM(S): at 17:37

## 2018-12-11 RX ADMIN — CEFTRIAXONE 100 GRAM(S): 500 INJECTION, POWDER, FOR SOLUTION INTRAMUSCULAR; INTRAVENOUS at 12:15

## 2018-12-11 RX ADMIN — ALBUTEROL 2.5 MILLIGRAM(S): 90 AEROSOL, METERED ORAL at 23:24

## 2018-12-11 RX ADMIN — LOSARTAN POTASSIUM 25 MILLIGRAM(S): 100 TABLET, FILM COATED ORAL at 06:35

## 2018-12-11 RX ADMIN — APIXABAN 5 MILLIGRAM(S): 2.5 TABLET, FILM COATED ORAL at 17:37

## 2018-12-11 RX ADMIN — ALBUTEROL 2.5 MILLIGRAM(S): 90 AEROSOL, METERED ORAL at 07:20

## 2018-12-11 RX ADMIN — Medication 1 TABLET(S): at 08:29

## 2018-12-11 RX ADMIN — Medication 20 MILLIGRAM(S): at 13:35

## 2018-12-11 RX ADMIN — BUDESONIDE AND FORMOTEROL FUMARATE DIHYDRATE 2 PUFF(S): 160; 4.5 AEROSOL RESPIRATORY (INHALATION) at 06:43

## 2018-12-11 RX ADMIN — APIXABAN 5 MILLIGRAM(S): 2.5 TABLET, FILM COATED ORAL at 06:35

## 2018-12-11 RX ADMIN — Medication 120 MILLIGRAM(S): at 06:35

## 2018-12-11 RX ADMIN — Medication 1 TABLET(S): at 17:37

## 2018-12-11 RX ADMIN — Medication 50 MILLIGRAM(S): at 06:35

## 2018-12-11 RX ADMIN — ALBUTEROL 2.5 MILLIGRAM(S): 90 AEROSOL, METERED ORAL at 14:28

## 2018-12-11 RX ADMIN — ATORVASTATIN CALCIUM 20 MILLIGRAM(S): 80 TABLET, FILM COATED ORAL at 21:42

## 2018-12-11 RX ADMIN — Medication 200 MILLIGRAM(S): at 04:05

## 2018-12-11 NOTE — PROGRESS NOTE ADULT - SUBJECTIVE AND OBJECTIVE BOX
Date/Time Patient Seen:  		  Referring MD:   Data Reviewed	       Patient is a 84y old  Female who presents with a chief complaint of PNA (10 Dec 2018 15:06)  in bed  seen and examined  vs and meds reviewed      Subjective/HPI     PAST MEDICAL & SURGICAL HISTORY:  HLD (hyperlipidemia)  HTN (hypertension)  Afib  No significant past surgical history        Medication list         MEDICATIONS  (STANDING):  ALBUTerol    0.083% 2.5 milliGRAM(s) Nebulizer every 8 hours  apixaban 5 milliGRAM(s) Oral every 12 hours  atorvastatin 20 milliGRAM(s) Oral at bedtime  azithromycin  IVPB 500 milliGRAM(s) IV Intermittent every 24 hours  buDESOnide  80 MICROgram(s)/formoterol 4.5 MICROgram(s) Inhaler 2 Puff(s) Inhalation two times a day  cefTRIAXone   IVPB 1 Gram(s) IV Intermittent every 24 hours  diltiazem    milliGRAM(s) Oral daily  lactobacillus acidophilus 1 Tablet(s) Oral two times a day with meals  losartan 25 milliGRAM(s) Oral daily  metoprolol succinate ER 50 milliGRAM(s) Oral two times a day  predniSONE   Tablet 20 milliGRAM(s) Oral three times a day  sodium chloride 0.9%. 1000 milliLiter(s) (75 mL/Hr) IV Continuous <Continuous>    MEDICATIONS  (PRN):  acetaminophen   Tablet .. 650 milliGRAM(s) Oral every 6 hours PRN Temp greater or equal to 38C (100.4F), Mild Pain (1 - 3)  ALBUTerol    0.083% 2.5 milliGRAM(s) Nebulizer every 3 hours PRN Shortness of Breath and/or Wheezing  benzonatate 100 milliGRAM(s) Oral three times a day PRN Cough  guaiFENesin   Syrup  (Sugar-Free) 200 milliGRAM(s) Oral every 6 hours PRN Cough  HYDROcodone/homatropine Syrup 5 milliLiter(s) Oral two times a day PRN severe cough         Vitals log        ICU Vital Signs Last 24 Hrs  T(C): 36.4 (11 Dec 2018 04:50), Max: 37.1 (10 Dec 2018 10:25)  T(F): 97.6 (11 Dec 2018 04:50), Max: 98.7 (10 Dec 2018 10:25)  HR: 88 (11 Dec 2018 07:20) (80 - 118)  BP: 142/85 (11 Dec 2018 06:33) (101/77 - 143/73)  BP(mean): --  ABP: --  ABP(mean): --  RR: 19 (11 Dec 2018 04:50) (16 - 22)  SpO2: 95% (11 Dec 2018 07:20) (91% - 97%)           Input and Output:  I&O's Detail    10 Dec 2018 07:01  -  11 Dec 2018 07:00  --------------------------------------------------------  IN:  Total IN: 0 mL    OUT:    Voided: 1000 mL  Total OUT: 1000 mL    Total NET: -1000 mL          Lab Data                        10.8   7.36  )-----------( 169      ( 11 Dec 2018 05:49 )             31.3     12-11    133<L>  |  96  |  15  ----------------------------<  127<H>  3.5   |  29  |  0.66    Ca    7.8<L>      11 Dec 2018 05:49    TPro  5.8<L>  /  Alb  2.3<L>  /  TBili  1.0  /  DBili  x   /  AST  48<H>  /  ALT  31  /  AlkPhos  86  12-11            Review of Systems	      Objective     Physical Examination        Pertinent Lab findings & Imaging      Leah:  NO   Adequate UO     I&O's Detail    10 Dec 2018 07:01  -  11 Dec 2018 07:00  --------------------------------------------------------  IN:  Total IN: 0 mL    OUT:    Voided: 1000 mL  Total OUT: 1000 mL    Total NET: -1000 mL               Discussed with:     Cultures:	        Radiology

## 2018-12-11 NOTE — CONSULT NOTE ADULT - SUBJECTIVE AND OBJECTIVE BOX
Patient is a 84y old  Female with PMHx of HTN, HLD, Afib (AC on Eliquis) who presents from home with 5 days of chest congestion, weakness, fever, cough, no appetite but liberal fluids intake, and diarrhea. Diagnosed with RSV pneumonitis. Noted to be hyponatremic with urinary retention requiring straight cath yesterday. Voids well since, treated with IVF and Sodium is improving at appropriate rate. She was on HCTZ as outpatient. She feels better today. No NSAIDS use.      PAST MEDICAL & SURGICAL HISTORY:  HLD (hyperlipidemia)  HTN (hypertension)  Afib  No significant past surgical history    FAMILY HISTORY:  Family history of abdominal aortic aneurysm (AAA) (Father)  Family history of type 2 diabetes mellitus (Sibling)  Family history of myocardial infarction (Father, Mother)    No Known Allergies    Home Medications:   · 	Toprol-XL 50 mg oral tablet, extended release: 1 tab(s) orally 2 times a day  · 	hydroCHLOROthiazide 25 mg oral tablet: 1 tab(s) orally once a day  · 	atorvastatin 20 mg oral tablet: 1 tab(s) orally once a day  · 	Eliquis 5 mg oral tablet: 1 tab(s) orally 2 times a day  · 	dilTIAZem 120 mg/24 hours oral tablet, extended release: 1 tab(s) orally once a day  · 	losartan 25 mg oral tablet: 1 tab(s) orally once a day  · 	Vitamin D3 1000 intl units oral tablet: 1 tab(s) orally once a day  · 	Centrum Women's oral tablet: 1 tab(s) orally once a day      MEDICATIONS  (STANDING):  ALBUTerol    0.083% 2.5 milliGRAM(s) Nebulizer every 8 hours  apixaban 5 milliGRAM(s) Oral every 12 hours  atorvastatin 20 milliGRAM(s) Oral at bedtime  azithromycin  IVPB 500 milliGRAM(s) IV Intermittent every 24 hours  buDESOnide  80 MICROgram(s)/formoterol 4.5 MICROgram(s) Inhaler 2 Puff(s) Inhalation two times a day  cefTRIAXone   IVPB 1 Gram(s) IV Intermittent every 24 hours  diltiazem    milliGRAM(s) Oral daily  lactobacillus acidophilus 1 Tablet(s) Oral two times a day with meals  losartan 25 milliGRAM(s) Oral daily  metoprolol succinate ER 50 milliGRAM(s) Oral two times a day  predniSONE   Tablet 20 milliGRAM(s) Oral three times a day    MEDICATIONS  (PRN):  acetaminophen   Tablet .. 650 milliGRAM(s) Oral every 6 hours PRN Temp greater or equal to 38C (100.4F), Mild Pain (1 - 3)  ALBUTerol    0.083% 2.5 milliGRAM(s) Nebulizer every 3 hours PRN Shortness of Breath and/or Wheezing  benzonatate 100 milliGRAM(s) Oral three times a day PRN Cough  guaiFENesin   Syrup  (Sugar-Free) 200 milliGRAM(s) Oral every 6 hours PRN Cough  HYDROcodone/homatropine Syrup 5 milliLiter(s) Oral two times a day PRN severe cough    I&O's Detail    10 Dec 2018 07:01  -  11 Dec 2018 07:00  --------------------------------------------------------  IN:  Total IN: 0 mL    OUT:    Voided: 1000 mL  Total OUT: 1000 mL    Total NET: -1000 mL      11 Dec 2018 07:01  -  11 Dec 2018 15:13  --------------------------------------------------------  IN:    IV PiggyBack: 250 mL    Oral Fluid: 200 mL    Solution: 100 mL  Total IN: 550 mL    OUT:  Total OUT: 0 mL    Total NET: 550 mL        Vital Signs Last 24 Hrs  T(C): 36.6 (11 Dec 2018 12:06), Max: 36.9 (10 Dec 2018 22:48)  T(F): 97.8 (11 Dec 2018 12:06), Max: 98.5 (10 Dec 2018 22:48)  HR: 88 (11 Dec 2018 14:29) (80 - 118)  BP: 110/62 (11 Dec 2018 12:06) (101/77 - 148/72)  BP(mean): --  RR: 18 (11 Dec 2018 12:06) (16 - 20)  SpO2: 95% (11 Dec 2018 14:29) (92% - 97%)    PHYSICAL EXAM:  General: NAD, in chair, AAO x3  Respiratory: b/l air entry, diffuse crackles  Cardiovascular: S1 S2 reg  Gastrointestinal: soft, BS present  Extremities: no edema  Neuro: nonfocal      CAPILLARY BLOOD GLUCOSE            133<L>  |  96  |  15  ----------------------------<  127<H>  3.5   |  29  |  0.66    Ca    7.8<L>      11 Dec 2018 05:49    TPro  5.8<L>  /  Alb  2.3<L>  /  TBili  1.0  /  DBili  x   /  AST  48<H>  /  ALT  31  /  AlkPhos  86      Hemoglobin: 10.8 g/dL ( 05:49)  Hematocrit: 31.3 % ( 05:49)  Potassium, Serum: 3.5 mmol/L ( 05:49)  Blood Urea Nitrogen, Serum: 15 mg/dL ( 05:49)  Calcium, Total Serum: 7.8 mg/dL ( 05:49)  Potassium, Serum: 2.9 mmol/L (12-10 @ 11:13)  Blood Urea Nitrogen, Serum: 24 mg/dL (12-10 @ 11:13)  Calcium, Total Serum: 8.2 mg/dL (12-10 @ 11:13)  Hemoglobin: 12.7 g/dL (12-10 @ 11:13)  Hematocrit: 36.6 % (12-10 @ 11:13)      Creatinine, Serum: 0.66 ( 05:49)  Creatinine, Serum: 0.96 (12-10 @ 11:13)    CBC Full  -  ( 11 Dec 2018 05:49 )  WBC Count : 7.36 K/uL  Hemoglobin : 10.8 g/dL  Hematocrit : 31.3 %  Platelet Count - Automated : 169 K/uL  Mean Cell Volume : 84.4 fl  Mean Cell Hemoglobin : 29.1 pg  Mean Cell Hemoglobin Concentration : 34.5 gm/dL  Auto Neutrophil # : 6.70 K/uL  Auto Lymphocyte # : 0.52 K/uL  Auto Monocyte # : 0.15 K/uL  Auto Eosinophil # : 0.00 K/uL  Auto Basophil # : 0.00 K/uL  Auto Neutrophil % : 84.0 %  Auto Lymphocyte % : 7.0 %  Auto Monocyte % : 2.0 %  Auto Eosinophil % : 0.0 %  Auto Basophil % : 0.0 %    Urinalysis Basic - ( 10 Dec 2018 20:15 )    Color: Yellow / Appearance: Slightly Turbid / S.020 / pH: x  Gluc: x / Ketone: Moderate  / Bili: Small / Urobili: 4   Blood: x / Protein: 75 mg/dL / Nitrite: Negative   Leuk Esterase: Negative / RBC: 3-5 /HPF / WBC 0-2   Sq Epi: x / Non Sq Epi: Occasional / Bacteria: Occasional

## 2018-12-11 NOTE — CONSULT NOTE ADULT - ASSESSMENT
Resolving hyponatremia, multifactorial, high ADH state due to pulmonary infection/stress contributed by HCTZ impaired urinary diluting capacity and high water/poor solutes intake.  Would keep off HCTZ permanently as she will be at risk for hyponatremia from it in the future. Check TFT's. May keep off IVF as oral intake has improved.  Monitor electrolytes daily while in hospital.  All questions answered.  Spoke with patient's daughter at bedside.

## 2018-12-11 NOTE — PROGRESS NOTE ADULT - SUBJECTIVE AND OBJECTIVE BOX
Patient is a 84y old  Female who presents with a chief complaint of PNA (11 Dec 2018 08:14)      INTERVAL HPI/OVERNIGHT EVENTS:  T(C): 36.8 (18 @ 08:32), Max: 36.9 (12-10-18 @ 22:48)  HR: 107 (18 @ 08:32) (80 - 118)  BP: 148/72 (18 @ 08:32) (101/77 - 148/72)  RR: 18 (18 @ 08:32) (16 - 22)  SpO2: 96% (18 @ 08:32) (92% - 97%)  Wt(kg): --  I&O's Summary    10 Dec 2018 07:01  -  11 Dec 2018 07:00  --------------------------------------------------------  IN: 0 mL / OUT: 1000 mL / NET: -1000 mL        LABS:                        10.8   7.36  )-----------( 169      ( 11 Dec 2018 05:49 )             31.3     12-11    133<L>  |  96  |  15  ----------------------------<  127<H>  3.5   |  29  |  0.66    Ca    7.8<L>      11 Dec 2018 05:49    TPro  5.8<L>  /  Alb  2.3<L>  /  TBili  1.0  /  DBili  x   /  AST  48<H>  /  ALT  31  /  AlkPhos  86  12-11    PT/INR - ( 10 Dec 2018 11:13 )   PT: 25.6 sec;   INR: 2.19 ratio         PTT - ( 10 Dec 2018 11:13 )  PTT:41.3 sec  Urinalysis Basic - ( 10 Dec 2018 20:15 )    Color: Yellow / Appearance: Slightly Turbid / S.020 / pH: x  Gluc: x / Ketone: Moderate  / Bili: Small / Urobili: 4   Blood: x / Protein: 75 mg/dL / Nitrite: Negative   Leuk Esterase: Negative / RBC: 3-5 /HPF / WBC 0-2   Sq Epi: x / Non Sq Epi: Occasional / Bacteria: Occasional      CAPILLARY BLOOD GLUCOSE            Urinalysis Basic - ( 10 Dec 2018 20:15 )    Color: Yellow / Appearance: Slightly Turbid / S.020 / pH: x  Gluc: x / Ketone: Moderate  / Bili: Small / Urobili: 4   Blood: x / Protein: 75 mg/dL / Nitrite: Negative   Leuk Esterase: Negative / RBC: 3-5 /HPF / WBC 0-2   Sq Epi: x / Non Sq Epi: Occasional / Bacteria: Occasional        MEDICATIONS  (STANDING):  ALBUTerol    0.083% 2.5 milliGRAM(s) Nebulizer every 8 hours  apixaban 5 milliGRAM(s) Oral every 12 hours  atorvastatin 20 milliGRAM(s) Oral at bedtime  azithromycin  IVPB 500 milliGRAM(s) IV Intermittent every 24 hours  buDESOnide  80 MICROgram(s)/formoterol 4.5 MICROgram(s) Inhaler 2 Puff(s) Inhalation two times a day  cefTRIAXone   IVPB 1 Gram(s) IV Intermittent every 24 hours  diltiazem    milliGRAM(s) Oral daily  lactobacillus acidophilus 1 Tablet(s) Oral two times a day with meals  losartan 25 milliGRAM(s) Oral daily  metoprolol succinate ER 50 milliGRAM(s) Oral two times a day  predniSONE   Tablet 20 milliGRAM(s) Oral three times a day  sodium chloride 0.9%. 1000 milliLiter(s) (75 mL/Hr) IV Continuous <Continuous>    MEDICATIONS  (PRN):  acetaminophen   Tablet .. 650 milliGRAM(s) Oral every 6 hours PRN Temp greater or equal to 38C (100.4F), Mild Pain (1 - 3)  ALBUTerol    0.083% 2.5 milliGRAM(s) Nebulizer every 3 hours PRN Shortness of Breath and/or Wheezing  benzonatate 100 milliGRAM(s) Oral three times a day PRN Cough  guaiFENesin   Syrup  (Sugar-Free) 200 milliGRAM(s) Oral every 6 hours PRN Cough  HYDROcodone/homatropine Syrup 5 milliLiter(s) Oral two times a day PRN severe cough      REVIEW OF SYSTEMS:  CONSTITUTIONAL: No fever, weight loss, or fatigue  EYES: No eye pain, visual disturbances, or discharge  ENMT:  No difficulty hearing, tinnitus, vertigo; No sinus or throat pain  NECK: No pain or stiffness  RESPIRATORY: No cough, wheezing, chills or hemoptysis; No shortness of breath  CARDIOVASCULAR: No chest pain, palpitations, dizziness, or leg swelling  GASTROINTESTINAL: No abdominal or epigastric pain. No nausea, vomiting, or hematemesis; No diarrhea or constipation. No melena or hematochezia.  GENITOURINARY: No dysuria, frequency, hematuria, or incontinence  NEUROLOGICAL: No headaches, memory loss, loss of strength, numbness, or tremors  SKIN: No itching, burning, rashes, or lesions   MUSCULOSKELETAL: No joint pain or swelling; No muscle, back, or extremity pain  PSYCHIATRIC: No depression, anxiety, mood swings, or difficulty sleeping    RADIOLOGY & ADDITIONAL TESTS:    Imaging Personally Reviewed:  [ ] YES  [ ] NO    Consultant(s) Notes Reviewed:  [ ] YES  [ ] NO    PHYSICAL EXAM:  GENERAL: NAD, well-groomed, well-developed  HEAD:  Atraumatic, Normocephalic  EYES: EOMI, PERRLA, conjunctiva and sclera clear  ENMT: No tonsillar erythema, exudates, or enlargement; Moist mucous membranes, Good dentition, No lesions  NECK: Supple, No JVD, Normal thyroid  NERVOUS SYSTEM:  Alert & Oriented X3, Good concentration; Motor Strength 5/5 B/L upper and lower extremities; DTRs 2+ intact and symmetric  CHEST/LUNG: Clear to auscultation bilaterally; No rales, rhonchi, wheezing, or rubs  HEART: Regular rate and rhythm; No murmurs, rubs, or gallops  ABDOMEN: Soft, Nontender, Nondistended; Bowel sounds present  EXTREMITIES:  2+ Peripheral Pulses, No clubbing, cyanosis, or edema  LYMPH: No lymphadenopathy noted  SKIN: No rashes or lesions    Care Discussed with Consultants/Other Providers [ ] YES  [ ] NO Patient is a 84y old  Female who presents with a chief complaint of PNA (11 Dec 2018 08:14)      INTERVAL HPI/OVERNIGHT EVENTS: Pt seen and examined at bedside. Pt admits to mild SOB, and continued decreased appetite.     T(C): 36.8 (18 @ 08:32), Max: 36.9 (12-10-18 @ 22:48)  HR: 107 (18 @ 08:32) (80 - 118)  BP: 148/72 (18 @ 08:32) (101/77 - 148/72)  RR: 18 (18 @ 08:32) (16 - 22)  SpO2: 96% (18 @ 08:32) (92% - 97%)  Wt(kg): --  I&O's Summary    10 Dec 2018 07:01  -  11 Dec 2018 07:00  --------------------------------------------------------  IN: 0 mL / OUT: 1000 mL / NET: -1000 mL        LABS:                        10.8   7.36  )-----------( 169      ( 11 Dec 2018 05:49 )             31.3     12-11    133<L>  |  96  |  15  ----------------------------<  127<H>  3.5   |  29  |  0.66    Ca    7.8<L>      11 Dec 2018 05:49    TPro  5.8<L>  /  Alb  2.3<L>  /  TBili  1.0  /  DBili  x   /  AST  48<H>  /  ALT  31  /  AlkPhos  86  12-11    PT/INR - ( 10 Dec 2018 11:13 )   PT: 25.6 sec;   INR: 2.19 ratio         PTT - ( 10 Dec 2018 11:13 )  PTT:41.3 sec  Urinalysis Basic - ( 10 Dec 2018 20:15 )    Color: Yellow / Appearance: Slightly Turbid / S.020 / pH: x  Gluc: x / Ketone: Moderate  / Bili: Small / Urobili: 4   Blood: x / Protein: 75 mg/dL / Nitrite: Negative   Leuk Esterase: Negative / RBC: 3-5 /HPF / WBC 0-2   Sq Epi: x / Non Sq Epi: Occasional / Bacteria: Occasional      CAPILLARY BLOOD GLUCOSE            Urinalysis Basic - ( 10 Dec 2018 20:15 )    Color: Yellow / Appearance: Slightly Turbid / S.020 / pH: x  Gluc: x / Ketone: Moderate  / Bili: Small / Urobili: 4   Blood: x / Protein: 75 mg/dL / Nitrite: Negative   Leuk Esterase: Negative / RBC: 3-5 /HPF / WBC 0-2   Sq Epi: x / Non Sq Epi: Occasional / Bacteria: Occasional        MEDICATIONS  (STANDING):  ALBUTerol    0.083% 2.5 milliGRAM(s) Nebulizer every 8 hours  apixaban 5 milliGRAM(s) Oral every 12 hours  atorvastatin 20 milliGRAM(s) Oral at bedtime  azithromycin  IVPB 500 milliGRAM(s) IV Intermittent every 24 hours  buDESOnide  80 MICROgram(s)/formoterol 4.5 MICROgram(s) Inhaler 2 Puff(s) Inhalation two times a day  cefTRIAXone   IVPB 1 Gram(s) IV Intermittent every 24 hours  diltiazem    milliGRAM(s) Oral daily  lactobacillus acidophilus 1 Tablet(s) Oral two times a day with meals  losartan 25 milliGRAM(s) Oral daily  metoprolol succinate ER 50 milliGRAM(s) Oral two times a day  predniSONE   Tablet 20 milliGRAM(s) Oral three times a day  sodium chloride 0.9%. 1000 milliLiter(s) (75 mL/Hr) IV Continuous <Continuous>    MEDICATIONS  (PRN):  acetaminophen   Tablet .. 650 milliGRAM(s) Oral every 6 hours PRN Temp greater or equal to 38C (100.4F), Mild Pain (1 - 3)  ALBUTerol    0.083% 2.5 milliGRAM(s) Nebulizer every 3 hours PRN Shortness of Breath and/or Wheezing  benzonatate 100 milliGRAM(s) Oral three times a day PRN Cough  guaiFENesin   Syrup  (Sugar-Free) 200 milliGRAM(s) Oral every 6 hours PRN Cough  HYDROcodone/homatropine Syrup 5 milliLiter(s) Oral two times a day PRN severe cough      REVIEW OF SYSTEMS:  CONSTITUTIONAL: No fever, weight loss, or fatigue  EYES: No eye pain, visual disturbances, or discharge  ENMT:  No difficulty hearing, tinnitus, vertigo; No sinus or throat pain  NECK: No pain or stiffness  RESPIRATORY: No cough, wheezing, chills or hemoptysis; No shortness of breath  CARDIOVASCULAR: No chest pain, palpitations, dizziness, or leg swelling  GASTROINTESTINAL: No abdominal or epigastric pain. No nausea, vomiting, or hematemesis; No diarrhea or constipation. No melena or hematochezia.  GENITOURINARY: No dysuria, frequency, hematuria, or incontinence  NEUROLOGICAL: No headaches, memory loss, loss of strength, numbness, or tremors  SKIN: No itching, burning, rashes, or lesions   MUSCULOSKELETAL: No joint pain or swelling; No muscle, back, or extremity pain  PSYCHIATRIC: No depression, anxiety, mood swings, or difficulty sleeping    RADIOLOGY & ADDITIONAL TESTS:    Imaging Personally Reviewed:  [ ] YES  [ ] NO    Consultant(s) Notes Reviewed:  [ ] YES  [ ] NO    PHYSICAL EXAM:  GENERAL: NAD, well-groomed, well-developed  HEAD:  Atraumatic, Normocephalic  EYES: EOMI, PERRLA, conjunctiva and sclera clear  ENMT: No tonsillar erythema, exudates, or enlargement; Moist mucous membranes, Good dentition, No lesions  NECK: Supple, No JVD, Normal thyroid  NERVOUS SYSTEM:  Alert & Oriented X3, Good concentration; Motor Strength 5/5 B/L upper and lower extremities; DTRs 2+ intact and symmetric  CHEST/LUNG: Clear to auscultation bilaterally; No rales, rhonchi, wheezing, or rubs  HEART: Regular rate and rhythm; No murmurs, rubs, or gallops  ABDOMEN: Soft, Nontender, Nondistended; Bowel sounds present  EXTREMITIES:  2+ Peripheral Pulses, No clubbing, cyanosis, or edema  LYMPH: No lymphadenopathy noted  SKIN: No rashes or lesions    Care Discussed with Consultants/Other Providers [ ] YES  [ ] NO Patient is a 84y old  Female who presents with a chief complaint of PNA (11 Dec 2018 08:14)      INTERVAL HPI/OVERNIGHT EVENTS: Pt seen and examined at bedside. Pt admits to mild SOB and continued cough with decreased appetite.      T(C): 36.8 (18 @ 08:32), Max: 36.9 (12-10-18 @ 22:48)  HR: 107 (18 @ 08:32) (80 - 118)  BP: 148/72 (18 @ 08:32) (101/77 - 148/72)  RR: 18 (18 @ 08:32) (16 - 22)  SpO2: 96% (18 @ 08:32) (92% - 97%)  Wt(kg): --  I&O's Summary    10 Dec 2018 07:01  -  11 Dec 2018 07:00  --------------------------------------------------------  IN: 0 mL / OUT: 1000 mL / NET: -1000 mL        LABS:                        10.8   7.36  )-----------( 169      ( 11 Dec 2018 05:49 )             31.3     12-11    133<L>  |  96  |  15  ----------------------------<  127<H>  3.5   |  29  |  0.66    Ca    7.8<L>      11 Dec 2018 05:49    TPro  5.8<L>  /  Alb  2.3<L>  /  TBili  1.0  /  DBili  x   /  AST  48<H>  /  ALT  31  /  AlkPhos  86  12-11    PT/INR - ( 10 Dec 2018 11:13 )   PT: 25.6 sec;   INR: 2.19 ratio         PTT - ( 10 Dec 2018 11:13 )  PTT:41.3 sec  Urinalysis Basic - ( 10 Dec 2018 20:15 )    Color: Yellow / Appearance: Slightly Turbid / S.020 / pH: x  Gluc: x / Ketone: Moderate  / Bili: Small / Urobili: 4   Blood: x / Protein: 75 mg/dL / Nitrite: Negative   Leuk Esterase: Negative / RBC: 3-5 /HPF / WBC 0-2   Sq Epi: x / Non Sq Epi: Occasional / Bacteria: Occasional      CAPILLARY BLOOD GLUCOSE            Urinalysis Basic - ( 10 Dec 2018 20:15 )    Color: Yellow / Appearance: Slightly Turbid / S.020 / pH: x  Gluc: x / Ketone: Moderate  / Bili: Small / Urobili: 4   Blood: x / Protein: 75 mg/dL / Nitrite: Negative   Leuk Esterase: Negative / RBC: 3-5 /HPF / WBC 0-2   Sq Epi: x / Non Sq Epi: Occasional / Bacteria: Occasional        MEDICATIONS  (STANDING):  ALBUTerol    0.083% 2.5 milliGRAM(s) Nebulizer every 8 hours  apixaban 5 milliGRAM(s) Oral every 12 hours  atorvastatin 20 milliGRAM(s) Oral at bedtime  azithromycin  IVPB 500 milliGRAM(s) IV Intermittent every 24 hours  buDESOnide  80 MICROgram(s)/formoterol 4.5 MICROgram(s) Inhaler 2 Puff(s) Inhalation two times a day  cefTRIAXone   IVPB 1 Gram(s) IV Intermittent every 24 hours  diltiazem    milliGRAM(s) Oral daily  lactobacillus acidophilus 1 Tablet(s) Oral two times a day with meals  losartan 25 milliGRAM(s) Oral daily  metoprolol succinate ER 50 milliGRAM(s) Oral two times a day  predniSONE   Tablet 20 milliGRAM(s) Oral three times a day  sodium chloride 0.9%. 1000 milliLiter(s) (75 mL/Hr) IV Continuous <Continuous>    MEDICATIONS  (PRN):  acetaminophen   Tablet .. 650 milliGRAM(s) Oral every 6 hours PRN Temp greater or equal to 38C (100.4F), Mild Pain (1 - 3)  ALBUTerol    0.083% 2.5 milliGRAM(s) Nebulizer every 3 hours PRN Shortness of Breath and/or Wheezing  benzonatate 100 milliGRAM(s) Oral three times a day PRN Cough  guaiFENesin   Syrup  (Sugar-Free) 200 milliGRAM(s) Oral every 6 hours PRN Cough  HYDROcodone/homatropine Syrup 5 milliLiter(s) Oral two times a day PRN severe cough      REVIEW OF SYSTEMS:  CONSTITUTIONAL: No fever, weight loss, or fatigue  EYES: No eye pain, visual disturbances, or discharge  ENMT:  No difficulty hearing, tinnitus, vertigo; No sinus or throat pain  NECK: No pain or stiffness  RESPIRATORY: No cough, wheezing, chills or hemoptysis; No shortness of breath  CARDIOVASCULAR: No chest pain, palpitations, dizziness, or leg swelling  GASTROINTESTINAL: No abdominal or epigastric pain. No nausea, vomiting, or hematemesis; No diarrhea or constipation. No melena or hematochezia.  GENITOURINARY: No dysuria, frequency, hematuria, or incontinence  NEUROLOGICAL: No headaches, memory loss, loss of strength, numbness, or tremors  SKIN: No itching, burning, rashes, or lesions   MUSCULOSKELETAL: No joint pain or swelling; No muscle, back, or extremity pain  PSYCHIATRIC: No depression, anxiety, mood swings, or difficulty sleeping    RADIOLOGY & ADDITIONAL TESTS:    Imaging Personally Reviewed:  [ ] YES  [ ] NO    Consultant(s) Notes Reviewed:  [ ] YES  [ ] NO    PHYSICAL EXAM:  GENERAL: NAD, well-groomed, well-developed  HEAD:  Atraumatic, Normocephalic  EYES: EOMI, PERRLA, conjunctiva and sclera clear  ENMT: No tonsillar erythema, exudates, or enlargement; Moist mucous membranes, Good dentition, No lesions  NECK: Supple, No JVD, Normal thyroid  NERVOUS SYSTEM:  Alert & Oriented X3, Good concentration; Motor Strength 5/5 B/L upper and lower extremities; DTRs 2+ intact and symmetric  CHEST/LUNG: Clear to auscultation bilaterally; No rales, rhonchi, wheezing, or rubs  HEART: Regular rate and rhythm; No murmurs, rubs, or gallops  ABDOMEN: Soft, Nontender, Nondistended; Bowel sounds present  EXTREMITIES:  2+ Peripheral Pulses, No clubbing, cyanosis, or edema  LYMPH: No lymphadenopathy noted  SKIN: No rashes or lesions    Care Discussed with Consultants/Other Providers [ ] YES  [ ] NO Patient is a 84y old  Female who presents with a chief complaint of PNA (11 Dec 2018 08:14)      INTERVAL HPI/OVERNIGHT EVENTS: Pt seen and examined at bedside. Pt admits to mild SOB and continued cough with decreased appetite. Pt denies current diarrhea, and is urinating well.     T(C): 36.8 (18 @ 08:32), Max: 36.9 (12-10-18 @ 22:48)  HR: 107 (18 @ 08:32) (80 - 118)  BP: 148/72 (18 @ 08:32) (101/77 - 148/72)  RR: 18 (18 @ 08:32) (16 - 22)  SpO2: 96% (18 @ 08:32) (92% - 97%)  Wt(kg): --  I&O's Summary    10 Dec 2018 07:01  -  11 Dec 2018 07:00  --------------------------------------------------------  IN: 0 mL / OUT: 1000 mL / NET: -1000 mL        LABS:                        10.8   7.36  )-----------( 169      ( 11 Dec 2018 05:49 )             31.3     12    133<L>  |  96  |  15  ----------------------------<  127<H>  3.5   |  29  |  0.66    Ca    7.8<L>      11 Dec 2018 05:49    TPro  5.8<L>  /  Alb  2.3<L>  /  TBili  1.0  /  DBili  x   /  AST  48<H>  /  ALT  31  /  AlkPhos  86  12-11    PT/INR - ( 10 Dec 2018 11:13 )   PT: 25.6 sec;   INR: 2.19 ratio         PTT - ( 10 Dec 2018 11:13 )  PTT:41.3 sec  Urinalysis Basic - ( 10 Dec 2018 20:15 )    Color: Yellow / Appearance: Slightly Turbid / S.020 / pH: x  Gluc: x / Ketone: Moderate  / Bili: Small / Urobili: 4   Blood: x / Protein: 75 mg/dL / Nitrite: Negative   Leuk Esterase: Negative / RBC: 3-5 /HPF / WBC 0-2   Sq Epi: x / Non Sq Epi: Occasional / Bacteria: Occasional      CAPILLARY BLOOD GLUCOSE            Urinalysis Basic - ( 10 Dec 2018 20:15 )    Color: Yellow / Appearance: Slightly Turbid / S.020 / pH: x  Gluc: x / Ketone: Moderate  / Bili: Small / Urobili: 4   Blood: x / Protein: 75 mg/dL / Nitrite: Negative   Leuk Esterase: Negative / RBC: 3-5 /HPF / WBC 0-2   Sq Epi: x / Non Sq Epi: Occasional / Bacteria: Occasional        MEDICATIONS  (STANDING):  ALBUTerol    0.083% 2.5 milliGRAM(s) Nebulizer every 8 hours  apixaban 5 milliGRAM(s) Oral every 12 hours  atorvastatin 20 milliGRAM(s) Oral at bedtime  azithromycin  IVPB 500 milliGRAM(s) IV Intermittent every 24 hours  buDESOnide  80 MICROgram(s)/formoterol 4.5 MICROgram(s) Inhaler 2 Puff(s) Inhalation two times a day  cefTRIAXone   IVPB 1 Gram(s) IV Intermittent every 24 hours  diltiazem    milliGRAM(s) Oral daily  lactobacillus acidophilus 1 Tablet(s) Oral two times a day with meals  losartan 25 milliGRAM(s) Oral daily  metoprolol succinate ER 50 milliGRAM(s) Oral two times a day  predniSONE   Tablet 20 milliGRAM(s) Oral three times a day  sodium chloride 0.9%. 1000 milliLiter(s) (75 mL/Hr) IV Continuous <Continuous>    MEDICATIONS  (PRN):  acetaminophen   Tablet .. 650 milliGRAM(s) Oral every 6 hours PRN Temp greater or equal to 38C (100.4F), Mild Pain (1 - 3)  ALBUTerol    0.083% 2.5 milliGRAM(s) Nebulizer every 3 hours PRN Shortness of Breath and/or Wheezing  benzonatate 100 milliGRAM(s) Oral three times a day PRN Cough  guaiFENesin   Syrup  (Sugar-Free) 200 milliGRAM(s) Oral every 6 hours PRN Cough  HYDROcodone/homatropine Syrup 5 milliLiter(s) Oral two times a day PRN severe cough      REVIEW OF SYSTEMS:  CONSTITUTIONAL: No fever, weight loss, or fatigue  EYES: No eye pain, visual disturbances, or discharge  ENMT:  No difficulty hearing, tinnitus, vertigo; No sinus or throat pain  NECK: No pain or stiffness  RESPIRATORY: (+) dry cough,no wheezing, chills or hemoptysis; (+) mild shortness of breath  CARDIOVASCULAR: No chest pain, palpitations, dizziness, or leg swelling  GASTROINTESTINAL: No abdominal or epigastric pain. No nausea, vomiting, or hematemesis; No diarrhea or constipation. No melena or hematochezia.  GENITOURINARY: No dysuria, frequency, hematuria, or incontinence  NEUROLOGICAL: No headaches, memory loss, loss of strength, numbness, or tremors  SKIN: No itching, burning, rashes, or lesions   MUSCULOSKELETAL: No joint pain or swelling; No muscle, back, or extremity pain  PSYCHIATRIC: No depression, anxiety, mood swings, or difficulty sleeping    RADIOLOGY & ADDITIONAL TESTS:    Imaging Personally Reviewed:  [x ] YES  [ ] NO    Consultant(s) Notes Reviewed:  [x ] YES  [ ] NO    PHYSICAL EXAM:  GENERAL: elderly female in NAD  HEAD:  Atraumatic, Normocephalic  EYES: EOMI, PERRLA, conjunctiva and sclera clear  ENMT: No tonsillar erythema, exudates, or enlargement; Moist mucous membranes, Good dentition, No lesions  NECK: Supple, No JVD, Normal thyroid  NERVOUS SYSTEM:  Alert & Oriented X3, Good concentration; Motor Strength 5/5 B/L upper and lower extremities; DTRs 2+ intact and symmetric  CHEST/LUNG: diffuse rhonchi R Lung fields, no wheezing, or rubs.  HEART: Regular rate and rhythm; No murmurs, rubs, or gallops  ABDOMEN: Soft, Nontender, Nondistended; Bowel sounds present  EXTREMITIES:  2+ Peripheral Pulses, No clubbing, cyanosis, or edema  LYMPH: No lymphadenopathy noted  SKIN: No rashes or lesions    Care Discussed with Consultants/Other Providers [x ] YES  [ ] NO

## 2018-12-12 DIAGNOSIS — R79.89 OTHER SPECIFIED ABNORMAL FINDINGS OF BLOOD CHEMISTRY: ICD-10-CM

## 2018-12-12 LAB
ALBUMIN SERPL ELPH-MCNC: 2.5 G/DL — LOW (ref 3.3–5)
ALP SERPL-CCNC: 99 U/L — SIGNIFICANT CHANGE UP (ref 40–120)
ALT FLD-CCNC: 33 U/L — SIGNIFICANT CHANGE UP (ref 12–78)
ANION GAP SERPL CALC-SCNC: 9 MMOL/L — SIGNIFICANT CHANGE UP (ref 5–17)
AST SERPL-CCNC: 39 U/L — HIGH (ref 15–37)
BASOPHILS # BLD AUTO: 0.01 K/UL — SIGNIFICANT CHANGE UP (ref 0–0.2)
BASOPHILS NFR BLD AUTO: 0.1 % — SIGNIFICANT CHANGE UP (ref 0–2)
BILIRUB SERPL-MCNC: 0.6 MG/DL — SIGNIFICANT CHANGE UP (ref 0.2–1.2)
BUN SERPL-MCNC: 13 MG/DL — SIGNIFICANT CHANGE UP (ref 7–23)
CALCIUM SERPL-MCNC: 8.3 MG/DL — LOW (ref 8.5–10.1)
CHLORIDE SERPL-SCNC: 101 MMOL/L — SIGNIFICANT CHANGE UP (ref 96–108)
CO2 SERPL-SCNC: 28 MMOL/L — SIGNIFICANT CHANGE UP (ref 22–31)
CREAT SERPL-MCNC: 0.79 MG/DL — SIGNIFICANT CHANGE UP (ref 0.5–1.3)
EOSINOPHIL # BLD AUTO: 0 K/UL — SIGNIFICANT CHANGE UP (ref 0–0.5)
EOSINOPHIL NFR BLD AUTO: 0 % — SIGNIFICANT CHANGE UP (ref 0–6)
GLUCOSE SERPL-MCNC: 152 MG/DL — HIGH (ref 70–99)
HCT VFR BLD CALC: 32.9 % — LOW (ref 34.5–45)
HGB BLD-MCNC: 11.2 G/DL — LOW (ref 11.5–15.5)
IMM GRANULOCYTES NFR BLD AUTO: 1.1 % — SIGNIFICANT CHANGE UP (ref 0–1.5)
LEGIONELLA AG UR QL: NEGATIVE — SIGNIFICANT CHANGE UP
LYMPHOCYTES # BLD AUTO: 0.7 K/UL — LOW (ref 1–3.3)
LYMPHOCYTES # BLD AUTO: 6.8 % — LOW (ref 13–44)
MAGNESIUM SERPL-MCNC: 2.6 MG/DL — SIGNIFICANT CHANGE UP (ref 1.6–2.6)
MCHC RBC-ENTMCNC: 29 PG — SIGNIFICANT CHANGE UP (ref 27–34)
MCHC RBC-ENTMCNC: 34 GM/DL — SIGNIFICANT CHANGE UP (ref 32–36)
MCV RBC AUTO: 85.2 FL — SIGNIFICANT CHANGE UP (ref 80–100)
MONOCYTES # BLD AUTO: 0.43 K/UL — SIGNIFICANT CHANGE UP (ref 0–0.9)
MONOCYTES NFR BLD AUTO: 4.1 % — SIGNIFICANT CHANGE UP (ref 2–14)
NEUTROPHILS # BLD AUTO: 9.12 K/UL — HIGH (ref 1.8–7.4)
NEUTROPHILS NFR BLD AUTO: 87.9 % — HIGH (ref 43–77)
PHOSPHATE SERPL-MCNC: 1.5 MG/DL — LOW (ref 2.5–4.5)
PLATELET # BLD AUTO: 239 K/UL — SIGNIFICANT CHANGE UP (ref 150–400)
POTASSIUM SERPL-MCNC: 3 MMOL/L — LOW (ref 3.5–5.3)
POTASSIUM SERPL-SCNC: 3 MMOL/L — LOW (ref 3.5–5.3)
PROT SERPL-MCNC: 6.6 G/DL — SIGNIFICANT CHANGE UP (ref 6–8.3)
RBC # BLD: 3.86 M/UL — SIGNIFICANT CHANGE UP (ref 3.8–5.2)
RBC # FLD: 13.2 % — SIGNIFICANT CHANGE UP (ref 10.3–14.5)
SODIUM SERPL-SCNC: 138 MMOL/L — SIGNIFICANT CHANGE UP (ref 135–145)
T4 FREE SERPL-MCNC: 1.6 NG/DL — SIGNIFICANT CHANGE UP (ref 0.9–1.8)
TSH SERPL-MCNC: 0.17 UIU/ML — LOW (ref 0.36–3.74)
WBC # BLD: 10.37 K/UL — SIGNIFICANT CHANGE UP (ref 3.8–10.5)
WBC # FLD AUTO: 10.37 K/UL — SIGNIFICANT CHANGE UP (ref 3.8–10.5)

## 2018-12-12 RX ORDER — POTASSIUM PHOSPHATE, MONOBASIC POTASSIUM PHOSPHATE, DIBASIC 236; 224 MG/ML; MG/ML
15 INJECTION, SOLUTION INTRAVENOUS ONCE
Qty: 0 | Refills: 0 | Status: COMPLETED | OUTPATIENT
Start: 2018-12-12 | End: 2018-12-12

## 2018-12-12 RX ORDER — POTASSIUM CHLORIDE 20 MEQ
40 PACKET (EA) ORAL EVERY 4 HOURS
Qty: 0 | Refills: 0 | Status: COMPLETED | OUTPATIENT
Start: 2018-12-12 | End: 2018-12-12

## 2018-12-12 RX ORDER — POTASSIUM PHOSPHATE, MONOBASIC POTASSIUM PHOSPHATE, DIBASIC 236; 224 MG/ML; MG/ML
30 INJECTION, SOLUTION INTRAVENOUS ONCE
Qty: 0 | Refills: 0 | Status: DISCONTINUED | OUTPATIENT
Start: 2018-12-12 | End: 2018-12-12

## 2018-12-12 RX ADMIN — ALBUTEROL 2.5 MILLIGRAM(S): 90 AEROSOL, METERED ORAL at 23:37

## 2018-12-12 RX ADMIN — ALBUTEROL 2.5 MILLIGRAM(S): 90 AEROSOL, METERED ORAL at 08:29

## 2018-12-12 RX ADMIN — POTASSIUM PHOSPHATE, MONOBASIC POTASSIUM PHOSPHATE, DIBASIC 62.5 MILLIMOLE(S): 236; 224 INJECTION, SOLUTION INTRAVENOUS at 10:32

## 2018-12-12 RX ADMIN — Medication 40 MILLIEQUIVALENT(S): at 14:07

## 2018-12-12 RX ADMIN — BUDESONIDE AND FORMOTEROL FUMARATE DIHYDRATE 2 PUFF(S): 160; 4.5 AEROSOL RESPIRATORY (INHALATION) at 06:26

## 2018-12-12 RX ADMIN — APIXABAN 5 MILLIGRAM(S): 2.5 TABLET, FILM COATED ORAL at 18:57

## 2018-12-12 RX ADMIN — Medication 40 MILLIEQUIVALENT(S): at 10:32

## 2018-12-12 RX ADMIN — ATORVASTATIN CALCIUM 20 MILLIGRAM(S): 80 TABLET, FILM COATED ORAL at 23:33

## 2018-12-12 RX ADMIN — Medication 20 MILLIGRAM(S): at 06:25

## 2018-12-12 RX ADMIN — ALBUTEROL 2.5 MILLIGRAM(S): 90 AEROSOL, METERED ORAL at 16:10

## 2018-12-12 RX ADMIN — Medication 1 TABLET(S): at 12:26

## 2018-12-12 RX ADMIN — LOSARTAN POTASSIUM 25 MILLIGRAM(S): 100 TABLET, FILM COATED ORAL at 06:25

## 2018-12-12 RX ADMIN — Medication 50 MILLIGRAM(S): at 18:57

## 2018-12-12 RX ADMIN — CEFTRIAXONE 100 GRAM(S): 500 INJECTION, POWDER, FOR SOLUTION INTRAMUSCULAR; INTRAVENOUS at 12:26

## 2018-12-12 RX ADMIN — Medication 20 MILLIGRAM(S): at 18:57

## 2018-12-12 RX ADMIN — BUDESONIDE AND FORMOTEROL FUMARATE DIHYDRATE 2 PUFF(S): 160; 4.5 AEROSOL RESPIRATORY (INHALATION) at 18:57

## 2018-12-12 RX ADMIN — Medication 120 MILLIGRAM(S): at 06:26

## 2018-12-12 RX ADMIN — Medication 50 MILLIGRAM(S): at 06:25

## 2018-12-12 RX ADMIN — APIXABAN 5 MILLIGRAM(S): 2.5 TABLET, FILM COATED ORAL at 06:25

## 2018-12-12 RX ADMIN — AZITHROMYCIN 255 MILLIGRAM(S): 500 TABLET, FILM COATED ORAL at 13:11

## 2018-12-12 RX ADMIN — Medication 1 TABLET(S): at 18:57

## 2018-12-12 NOTE — PROGRESS NOTE ADULT - PROBLEM SELECTOR PLAN 7
TSH 0.17, Free thyroxine 1.6 WNL  - endo consulted (Dr. C Perlman), states likely due to glucocorticoid effect +/- euthyroid sick syndrome, check t3, t4 levels.
chronic, stable  - c/w with home diltiazem, losartan, and Toprol XL  - holding pts home HCTZ due to hyponatremia

## 2018-12-12 NOTE — PHYSICAL THERAPY INITIAL EVALUATION ADULT - DISCHARGE DISPOSITION, PT EVAL
no skilled PT needs/Pt is functionally independent and requires no skilled physical therapy needs. Will discharge from physical therapy secondary to patient independent/home

## 2018-12-12 NOTE — PROGRESS NOTE ADULT - PROBLEM SELECTOR PLAN 8
chronic, stable  - c/w with home diltiazem, losartan, and Toprol XL  - DC'ed pts home HCTZ due to hyponatremia
chronic, stable  - c/w home statin

## 2018-12-12 NOTE — PROGRESS NOTE ADULT - PROBLEM SELECTOR PLAN 4
RESOLVED, likely 2/2 GI losses from diarrhea  - Na 138, s/p 2L NS bolus in ED.  - IVF DC'ed, pt tolerating diet. f/u AM CMP  - renal consulted (Dr. Vieyra), recs keep off IVF, Hold HCTZ permanently.
improving, likely 2/2 GI losses from diarrhea  - Na 133, s/p 2L NS bolus in ED.  - IVF DC'ed, pt tolerating diet. f/u AM CMP  - Holding HCTZ  - renal consulted (Dr. Ferrer), awaiting recs

## 2018-12-12 NOTE — PROGRESS NOTE ADULT - PROBLEM SELECTOR PLAN 10
advance care planning discussed greater than 15 min  health care proxy form with appointed health care agent placed in chart
advance care planning discussed greater than 15 min  health care proxy form with appointed health care agent placed in chart

## 2018-12-12 NOTE — PHYSICAL THERAPY INITIAL EVALUATION ADULT - ADDITIONAL COMMENTS
Pt lives alone in an apt, no steps. Pt is an independent ambulator without device and independent with ADLs. + driving

## 2018-12-12 NOTE — PROGRESS NOTE ADULT - PROBLEM SELECTOR PLAN 9
PADUA SCORE: 5, pharmacologic ppx indicated  - Pt already AC on Eliquis, will continue home med
PADUA SCORE: 5, pharmacologic ppx indicated  - Pt already AC on Eliquis, will continue home med

## 2018-12-12 NOTE — CONSULT NOTE ADULT - SUBJECTIVE AND OBJECTIVE BOX
Patient is a 84y old  Female who presents with a chief complaint of PNA (12 Dec 2018 08:11)      Reason For Consult: abnl tfts    HPI:  Pt is a 85 y/o F with PMHx of HTN, HLD, Afib (AC on Eliquis) who presents from home with 5 days of sob, weakness, nonproductive cough, and diarrhea since Saturday. Pt states she had cold-like symptoms with a dry cough starting Wednesday which developed into a fever and chills by Friday. Pt was then seen at urgent care Friday, with a temperature of 101.8. Pt was discharged with a viral illness, and given albuterol, hydroxyzine, benzonate, and an oral lidocaine viscous solution. Pt then developed diarrhea on Saturday with worsening productive cough with greenish sputum. Pt reports 5-10 episodes of diarrhea per day since Saturday. Admits to decreased appetite as well. Pt denies any current HA, CP, SOB, N/V.     In the ED: VS T 98.7, , /73, RR 20, 91%RA. CBC WNL. CMP Na: 125, K 2.9, Cl 87, BUN 24, Bili 1.5, Lactate 2.2, RVP: RSV (+), CXR showing multifocal PNA. Pt was given 2x dose DUONEB, 1x dose azithro, 1x dose rocephin, 2L NS Bolus, 1x dose KCl 40meq. BCx sent. (10 Dec 2018 13:28)      PAST MEDICAL & SURGICAL HISTORY:  HLD (hyperlipidemia)  HTN (hypertension)  Afib  No significant past surgical history      FAMILY HISTORY:  Family history of abdominal aortic aneurysm (AAA) (Father)  Family history of type 2 diabetes mellitus (Sibling)  Family history of myocardial infarction (Father, Mother)        Social History:    MEDICATIONS  (STANDING):  ALBUTerol    0.083% 2.5 milliGRAM(s) Nebulizer every 8 hours  apixaban 5 milliGRAM(s) Oral every 12 hours  atorvastatin 20 milliGRAM(s) Oral at bedtime  azithromycin  IVPB 500 milliGRAM(s) IV Intermittent every 24 hours  buDESOnide  80 MICROgram(s)/formoterol 4.5 MICROgram(s) Inhaler 2 Puff(s) Inhalation two times a day  cefTRIAXone   IVPB 1 Gram(s) IV Intermittent every 24 hours  diltiazem    milliGRAM(s) Oral daily  lactobacillus acidophilus 1 Tablet(s) Oral two times a day with meals  losartan 25 milliGRAM(s) Oral daily  metoprolol succinate ER 50 milliGRAM(s) Oral two times a day  potassium chloride    Tablet ER 40 milliEquivalent(s) Oral every 4 hours  predniSONE   Tablet 20 milliGRAM(s) Oral two times a day    MEDICATIONS  (PRN):  acetaminophen   Tablet .. 650 milliGRAM(s) Oral every 6 hours PRN Temp greater or equal to 38C (100.4F), Mild Pain (1 - 3)  ALBUTerol    0.083% 2.5 milliGRAM(s) Nebulizer every 3 hours PRN Shortness of Breath and/or Wheezing  benzonatate 100 milliGRAM(s) Oral three times a day PRN Cough  guaiFENesin   Syrup  (Sugar-Free) 200 milliGRAM(s) Oral every 6 hours PRN Cough  HYDROcodone/homatropine Syrup 5 milliLiter(s) Oral two times a day PRN severe cough        T(C): 36.2 (12-12-18 @ 11:54), Max: 36.8 (12-12-18 @ 08:20)  HR: 103 (12-12-18 @ 11:54) (88 - 106)  BP: 114/60 (12-12-18 @ 11:54) (110/62 - 141/92)  RR: 18 (12-12-18 @ 11:54) (18 - 19)  SpO2: 99% (12-12-18 @ 11:54) (92% - 99%)  Wt(kg): --    PHYSICAL EXAM:  GENERAL: NAD, well-groomed, well-developed  HEAD:  Atraumatic, Normocephalic  NECK: Supple, No JVD, Normal thyroid  CHEST/LUNG: diminished breath sounds  HEART: Regular rate and rhythm; No murmurs, rubs, or gallops  ABDOMEN: Soft, Nontender, Nondistended; Bowel sounds present  EXTREMITIES:  2+ Peripheral Pulses, No clubbing, cyanosis, or edema      CAPILLARY BLOOD GLUCOSE                                11.2   10.37 )-----------( 239      ( 12 Dec 2018 06:58 )             32.9       CMP:  12-12 @ 06:58  SGPT 33  Albumin 2.5   Alk Phos 99   Anion Gap 9   SGOT 39   Total Bili 0.6   BUN 13   Calcium Total 8.3   CO2 28   Chloride 101   Creatinine 0.79   eGFR if AA 80   eGFR if non AA 69   Glucose 152   Potassium 3.0   Protein 6.6   Sodium 138      Thyroid Function Tests:  12-12 @ 08:11 TSH -- FreeT4 1.6 T3 -- Anti TPO -- Anti Thyroglobulin Ab -- TSI --  12-12 @ 06:58 TSH 0.17 FreeT4 -- T3 -- Anti TPO -- Anti Thyroglobulin Ab -- TSI --      Diabetes Tests:       Radiology:

## 2018-12-12 NOTE — PROGRESS NOTE ADULT - PROBLEM SELECTOR PLAN 2
RVP showing RSV (+)  - pulm following  - mgmt as above
RVP showing RSV (+)  - pulm following  - mgmt as above

## 2018-12-12 NOTE — PROGRESS NOTE ADULT - PROBLEM SELECTOR PLAN 5
likely 2/2 GI losses from diarrhea vs poor intake  - K 3.0, repleted with KCl 40meq x2, Kphos 15 IVPB  - f/u AM CMP
resolved, likely 2/2 GI losses from diarrhea  - K 3.5, s/p 2x doses KCl 40meq in ED.  - f/u AM CMP

## 2018-12-12 NOTE — PROGRESS NOTE ADULT - SUBJECTIVE AND OBJECTIVE BOX
Patient is a 84y old  Female who presents with a chief complaint of PNA (12 Dec 2018 11:58)      INTERVAL HPI/OVERNIGHT EVENTS:  T(C): 36.2 (18 @ 11:54), Max: 36.8 (18 @ 08:20)  HR: 103 (18 @ 11:54) (88 - 106)  BP: 114/60 (18 @ 11:54) (112/75 - 141/92)  RR: 18 (18 @ 11:54) (18 - 19)  SpO2: 99% (18 @ 11:54) (92% - 99%)  Wt(kg): --  I&O's Summary    11 Dec 2018 07:01  -  12 Dec 2018 07:00  --------------------------------------------------------  IN: 720 mL / OUT: 0 mL / NET: 720 mL        LABS:                        11.2   10.37 )-----------( 239      ( 12 Dec 2018 06:58 )             32.9     12-12    138  |  101  |  13  ----------------------------<  152<H>  3.0<L>   |  28  |  0.79    Ca    8.3<L>      12 Dec 2018 06:58  Phos  1.5     -12  Mg     2.6     -12    TPro  6.6  /  Alb  2.5<L>  /  TBili  0.6  /  DBili  x   /  AST  39<H>  /  ALT  33  /  AlkPhos  99  12-12      Urinalysis Basic - ( 10 Dec 2018 20:15 )    Color: Yellow / Appearance: Slightly Turbid / S.020 / pH: x  Gluc: x / Ketone: Moderate  / Bili: Small / Urobili: 4   Blood: x / Protein: 75 mg/dL / Nitrite: Negative   Leuk Esterase: Negative / RBC: 3-5 /HPF / WBC 0-2   Sq Epi: x / Non Sq Epi: Occasional / Bacteria: Occasional      CAPILLARY BLOOD GLUCOSE            Urinalysis Basic - ( 10 Dec 2018 20:15 )    Color: Yellow / Appearance: Slightly Turbid / S.020 / pH: x  Gluc: x / Ketone: Moderate  / Bili: Small / Urobili: 4   Blood: x / Protein: 75 mg/dL / Nitrite: Negative   Leuk Esterase: Negative / RBC: 3-5 /HPF / WBC 0-2   Sq Epi: x / Non Sq Epi: Occasional / Bacteria: Occasional        MEDICATIONS  (STANDING):  ALBUTerol    0.083% 2.5 milliGRAM(s) Nebulizer every 8 hours  apixaban 5 milliGRAM(s) Oral every 12 hours  atorvastatin 20 milliGRAM(s) Oral at bedtime  azithromycin  IVPB 500 milliGRAM(s) IV Intermittent every 24 hours  buDESOnide  80 MICROgram(s)/formoterol 4.5 MICROgram(s) Inhaler 2 Puff(s) Inhalation two times a day  cefTRIAXone   IVPB 1 Gram(s) IV Intermittent every 24 hours  diltiazem    milliGRAM(s) Oral daily  lactobacillus acidophilus 1 Tablet(s) Oral two times a day with meals  losartan 25 milliGRAM(s) Oral daily  metoprolol succinate ER 50 milliGRAM(s) Oral two times a day  potassium chloride    Tablet ER 40 milliEquivalent(s) Oral every 4 hours  predniSONE   Tablet 20 milliGRAM(s) Oral two times a day    MEDICATIONS  (PRN):  acetaminophen   Tablet .. 650 milliGRAM(s) Oral every 6 hours PRN Temp greater or equal to 38C (100.4F), Mild Pain (1 - 3)  ALBUTerol    0.083% 2.5 milliGRAM(s) Nebulizer every 3 hours PRN Shortness of Breath and/or Wheezing  benzonatate 100 milliGRAM(s) Oral three times a day PRN Cough  guaiFENesin   Syrup  (Sugar-Free) 200 milliGRAM(s) Oral every 6 hours PRN Cough  HYDROcodone/homatropine Syrup 5 milliLiter(s) Oral two times a day PRN severe cough      REVIEW OF SYSTEMS:  CONSTITUTIONAL: No fever, weight loss, or fatigue  EYES: No eye pain, visual disturbances, or discharge  ENMT:  No difficulty hearing, tinnitus, vertigo; No sinus or throat pain  NECK: No pain or stiffness  RESPIRATORY: No cough, wheezing, chills or hemoptysis; No shortness of breath  CARDIOVASCULAR: No chest pain, palpitations, dizziness, or leg swelling  GASTROINTESTINAL: No abdominal or epigastric pain. No nausea, vomiting, or hematemesis; No diarrhea or constipation. No melena or hematochezia.  GENITOURINARY: No dysuria, frequency, hematuria, or incontinence  NEUROLOGICAL: No headaches, memory loss, loss of strength, numbness, or tremors  SKIN: No itching, burning, rashes, or lesions   MUSCULOSKELETAL: No joint pain or swelling; No muscle, back, or extremity pain  PSYCHIATRIC: No depression, anxiety, mood swings, or difficulty sleeping    RADIOLOGY & ADDITIONAL TESTS:    Imaging Personally Reviewed:  [ ] YES  [ ] NO    Consultant(s) Notes Reviewed:  [ ] YES  [ ] NO    PHYSICAL EXAM:  GENERAL: NAD, well-groomed, well-developed  HEAD:  Atraumatic, Normocephalic  EYES: EOMI, PERRLA, conjunctiva and sclera clear  ENMT: No tonsillar erythema, exudates, or enlargement; Moist mucous membranes, Good dentition, No lesions  NECK: Supple, No JVD, Normal thyroid  NERVOUS SYSTEM:  Alert & Oriented X3, Good concentration; Motor Strength 5/5 B/L upper and lower extremities; DTRs 2+ intact and symmetric  CHEST/LUNG: Clear to auscultation bilaterally; No rales, rhonchi, wheezing, or rubs  HEART: Regular rate and rhythm; No murmurs, rubs, or gallops  ABDOMEN: Soft, Nontender, Nondistended; Bowel sounds present  EXTREMITIES:  2+ Peripheral Pulses, No clubbing, cyanosis, or edema  LYMPH: No lymphadenopathy noted  SKIN: No rashes or lesions    Care Discussed with Consultants/Other Providers [ ] YES  [ ] NO Patient is a 84y old  Female who presents with a chief complaint of PNA (12 Dec 2018 11:58)      INTERVAL HPI/OVERNIGHT EVENTS: Pt seen and examined at bedside. Pt tolerated breakfast.   T(C): 36.2 (18 @ 11:54), Max: 36.8 (18 @ 08:20)  HR: 103 (18 @ 11:54) (88 - 106)  BP: 114/60 (18 @ 11:54) (112/75 - 141/92)  RR: 18 (18 @ 11:54) (18 - 19)  SpO2: 99% (18 @ 11:54) (92% - 99%)  Wt(kg): --  I&O's Summary    11 Dec 2018 07:01  -  12 Dec 2018 07:00  --------------------------------------------------------  IN: 720 mL / OUT: 0 mL / NET: 720 mL        LABS:                        11.2   10.37 )-----------( 239      ( 12 Dec 2018 06:58 )             32.9     -12    138  |  101  |  13  ----------------------------<  152<H>  3.0<L>   |  28  |  0.79    Ca    8.3<L>      12 Dec 2018 06:58  Phos  1.5       Mg     2.6         TPro  6.6  /  Alb  2.5<L>  /  TBili  0.6  /  DBili  x   /  AST  39<H>  /  ALT  33  /  AlkPhos  99  12      Urinalysis Basic - ( 10 Dec 2018 20:15 )    Color: Yellow / Appearance: Slightly Turbid / S.020 / pH: x  Gluc: x / Ketone: Moderate  / Bili: Small / Urobili: 4   Blood: x / Protein: 75 mg/dL / Nitrite: Negative   Leuk Esterase: Negative / RBC: 3-5 /HPF / WBC 0-2   Sq Epi: x / Non Sq Epi: Occasional / Bacteria: Occasional      CAPILLARY BLOOD GLUCOSE            Urinalysis Basic - ( 10 Dec 2018 20:15 )    Color: Yellow / Appearance: Slightly Turbid / S.020 / pH: x  Gluc: x / Ketone: Moderate  / Bili: Small / Urobili: 4   Blood: x / Protein: 75 mg/dL / Nitrite: Negative   Leuk Esterase: Negative / RBC: 3-5 /HPF / WBC 0-2   Sq Epi: x / Non Sq Epi: Occasional / Bacteria: Occasional        MEDICATIONS  (STANDING):  ALBUTerol    0.083% 2.5 milliGRAM(s) Nebulizer every 8 hours  apixaban 5 milliGRAM(s) Oral every 12 hours  atorvastatin 20 milliGRAM(s) Oral at bedtime  azithromycin  IVPB 500 milliGRAM(s) IV Intermittent every 24 hours  buDESOnide  80 MICROgram(s)/formoterol 4.5 MICROgram(s) Inhaler 2 Puff(s) Inhalation two times a day  cefTRIAXone   IVPB 1 Gram(s) IV Intermittent every 24 hours  diltiazem    milliGRAM(s) Oral daily  lactobacillus acidophilus 1 Tablet(s) Oral two times a day with meals  losartan 25 milliGRAM(s) Oral daily  metoprolol succinate ER 50 milliGRAM(s) Oral two times a day  potassium chloride    Tablet ER 40 milliEquivalent(s) Oral every 4 hours  predniSONE   Tablet 20 milliGRAM(s) Oral two times a day    MEDICATIONS  (PRN):  acetaminophen   Tablet .. 650 milliGRAM(s) Oral every 6 hours PRN Temp greater or equal to 38C (100.4F), Mild Pain (1 - 3)  ALBUTerol    0.083% 2.5 milliGRAM(s) Nebulizer every 3 hours PRN Shortness of Breath and/or Wheezing  benzonatate 100 milliGRAM(s) Oral three times a day PRN Cough  guaiFENesin   Syrup  (Sugar-Free) 200 milliGRAM(s) Oral every 6 hours PRN Cough  HYDROcodone/homatropine Syrup 5 milliLiter(s) Oral two times a day PRN severe cough      REVIEW OF SYSTEMS:  CONSTITUTIONAL: No fever, weight loss, or fatigue  EYES: No eye pain, visual disturbances, or discharge  ENMT:  No difficulty hearing, tinnitus, vertigo; No sinus or throat pain  NECK: No pain or stiffness  RESPIRATORY: No cough, wheezing, chills or hemoptysis; No shortness of breath  CARDIOVASCULAR: No chest pain, palpitations, dizziness, or leg swelling  GASTROINTESTINAL: No abdominal or epigastric pain. No nausea, vomiting, or hematemesis; No diarrhea or constipation. No melena or hematochezia.  GENITOURINARY: No dysuria, frequency, hematuria, or incontinence  NEUROLOGICAL: No headaches, memory loss, loss of strength, numbness, or tremors  SKIN: No itching, burning, rashes, or lesions   MUSCULOSKELETAL: No joint pain or swelling; No muscle, back, or extremity pain  PSYCHIATRIC: No depression, anxiety, mood swings, or difficulty sleeping    RADIOLOGY & ADDITIONAL TESTS:    Imaging Personally Reviewed:  [ ] YES  [ ] NO    Consultant(s) Notes Reviewed:  [ ] YES  [ ] NO    PHYSICAL EXAM:  GENERAL: NAD, well-groomed, well-developed  HEAD:  Atraumatic, Normocephalic  EYES: EOMI, PERRLA, conjunctiva and sclera clear  ENMT: No tonsillar erythema, exudates, or enlargement; Moist mucous membranes, Good dentition, No lesions  NECK: Supple, No JVD, Normal thyroid  NERVOUS SYSTEM:  Alert & Oriented X3, Good concentration; Motor Strength 5/5 B/L upper and lower extremities; DTRs 2+ intact and symmetric  CHEST/LUNG: Clear to auscultation bilaterally; No rales, rhonchi, wheezing, or rubs  HEART: Regular rate and rhythm; No murmurs, rubs, or gallops  ABDOMEN: Soft, Nontender, Nondistended; Bowel sounds present  EXTREMITIES:  2+ Peripheral Pulses, No clubbing, cyanosis, or edema  LYMPH: No lymphadenopathy noted  SKIN: No rashes or lesions    Care Discussed with Consultants/Other Providers [ ] YES  [ ] NO Patient is a 84y old  Female who presents with a chief complaint of PNA (12 Dec 2018 11:58)      INTERVAL HPI/OVERNIGHT EVENTS: Pt seen and examined at bedside. Pt tolerated breakfast well. States SOB resolved, cough improving. Denies fevers, chills, CP, SOB, N/V/D.     T(C): 36.2 (18 @ 11:54), Max: 36.8 (18 @ 08:20)  HR: 103 (18 @ 11:54) (88 - 106)  BP: 114/60 (18 @ 11:54) (112/75 - 141/92)  RR: 18 (18 @ 11:54) (18 - 19)  SpO2: 99% (18 @ 11:54) (92% - 99%)  Wt(kg): --  I&O's Summary    11 Dec 2018 07:01  -  12 Dec 2018 07:00  --------------------------------------------------------  IN: 720 mL / OUT: 0 mL / NET: 720 mL        LABS:                        11.2   10.37 )-----------( 239      ( 12 Dec 2018 06:58 )             32.9     -12    138  |  101  |  13  ----------------------------<  152<H>  3.0<L>   |  28  |  0.79    Ca    8.3<L>      12 Dec 2018 06:58  Phos  1.5       Mg     2.6         TPro  6.6  /  Alb  2.5<L>  /  TBili  0.6  /  DBili  x   /  AST  39<H>  /  ALT  33  /  AlkPhos  99  12      Urinalysis Basic - ( 10 Dec 2018 20:15 )    Color: Yellow / Appearance: Slightly Turbid / S.020 / pH: x  Gluc: x / Ketone: Moderate  / Bili: Small / Urobili: 4   Blood: x / Protein: 75 mg/dL / Nitrite: Negative   Leuk Esterase: Negative / RBC: 3-5 /HPF / WBC 0-2   Sq Epi: x / Non Sq Epi: Occasional / Bacteria: Occasional      CAPILLARY BLOOD GLUCOSE            Urinalysis Basic - ( 10 Dec 2018 20:15 )    Color: Yellow / Appearance: Slightly Turbid / S.020 / pH: x  Gluc: x / Ketone: Moderate  / Bili: Small / Urobili: 4   Blood: x / Protein: 75 mg/dL / Nitrite: Negative   Leuk Esterase: Negative / RBC: 3-5 /HPF / WBC 0-2   Sq Epi: x / Non Sq Epi: Occasional / Bacteria: Occasional        MEDICATIONS  (STANDING):  ALBUTerol    0.083% 2.5 milliGRAM(s) Nebulizer every 8 hours  apixaban 5 milliGRAM(s) Oral every 12 hours  atorvastatin 20 milliGRAM(s) Oral at bedtime  azithromycin  IVPB 500 milliGRAM(s) IV Intermittent every 24 hours  buDESOnide  80 MICROgram(s)/formoterol 4.5 MICROgram(s) Inhaler 2 Puff(s) Inhalation two times a day  cefTRIAXone   IVPB 1 Gram(s) IV Intermittent every 24 hours  diltiazem    milliGRAM(s) Oral daily  lactobacillus acidophilus 1 Tablet(s) Oral two times a day with meals  losartan 25 milliGRAM(s) Oral daily  metoprolol succinate ER 50 milliGRAM(s) Oral two times a day  potassium chloride    Tablet ER 40 milliEquivalent(s) Oral every 4 hours  predniSONE   Tablet 20 milliGRAM(s) Oral two times a day    MEDICATIONS  (PRN):  acetaminophen   Tablet .. 650 milliGRAM(s) Oral every 6 hours PRN Temp greater or equal to 38C (100.4F), Mild Pain (1 - 3)  ALBUTerol    0.083% 2.5 milliGRAM(s) Nebulizer every 3 hours PRN Shortness of Breath and/or Wheezing  benzonatate 100 milliGRAM(s) Oral three times a day PRN Cough  guaiFENesin   Syrup  (Sugar-Free) 200 milliGRAM(s) Oral every 6 hours PRN Cough  HYDROcodone/homatropine Syrup 5 milliLiter(s) Oral two times a day PRN severe cough      REVIEW OF SYSTEMS:  CONSTITUTIONAL: No fever, weight loss, or fatigue  EYES: No eye pain, visual disturbances, or discharge  ENMT:  No difficulty hearing, tinnitus, vertigo; No sinus or throat pain  NECK: No pain or stiffness  RESPIRATORY: (+) mild cough, no wheezing, chills or hemoptysis; No shortness of breath  CARDIOVASCULAR: No chest pain, palpitations, dizziness, or leg swelling  GASTROINTESTINAL: No abdominal or epigastric pain. No nausea, vomiting, or hematemesis; No diarrhea or constipation. No melena or hematochezia.  GENITOURINARY: No dysuria, frequency, hematuria, or incontinence  NEUROLOGICAL: No headaches, memory loss, loss of strength, numbness, or tremors  SKIN: No itching, burning, rashes, or lesions   MUSCULOSKELETAL: No joint pain or swelling; No muscle, back, or extremity pain  PSYCHIATRIC: No depression, anxiety, mood swings, or difficulty sleeping    RADIOLOGY & ADDITIONAL TESTS:    Imaging Personally Reviewed:  [ x ] YES  [ ] NO    Consultant(s) Notes Reviewed:  [ x ] YES  [ ] NO    PHYSICAL EXAM:  GENERAL: NAD, well-groomed, well-developed  HEAD:  Atraumatic, Normocephalic  EYES: EOMI, PERRLA, conjunctiva and sclera clear  ENMT: No tonsillar erythema, exudates, or enlargement; Moist mucous membranes, Good dentition, No lesions  NECK: Supple, No JVD, Normal thyroid  NERVOUS SYSTEM:  Alert & Oriented X3, Good concentration; Motor Strength 5/5 B/L upper and lower extremities; DTRs 2+ intact and symmetric  CHEST/LUNG: rhonchi R lung fields, no wheezing, or rubs  HEART: Regular rate and rhythm; No murmurs, rubs, or gallops  ABDOMEN: Soft, Nontender, Nondistended; Bowel sounds present  EXTREMITIES:  2+ Peripheral Pulses, No clubbing, cyanosis, or edema  LYMPH: No lymphadenopathy noted  SKIN: No rashes or lesions    Care Discussed with Consultants/Other Providers [ x ] YES  [ ] NO

## 2018-12-12 NOTE — PROGRESS NOTE ADULT - PROBLEM SELECTOR PLAN 3
likely 2/2 viral illness, RESOLVED  - pt previously w/ 5-10 episode of loose stools per day  - IVF DC'ed  - kaopectate q4h PRN
likely 2/2 viral illness, RESOLVED  - pt previously w/ 5-10 episode of loose stools per day  - IVF DC'ed  - kaopectate q4h PRN

## 2018-12-12 NOTE — PROGRESS NOTE ADULT - SUBJECTIVE AND OBJECTIVE BOX
Date/Time Patient Seen:  		  Referring MD:   Data Reviewed	       Patient is a 84y old  Female who presents with a chief complaint of PNA (11 Dec 2018 15:12)  vs and meds reviewed        Subjective/HPI     PAST MEDICAL & SURGICAL HISTORY:  HLD (hyperlipidemia)  HTN (hypertension)  Afib  No significant past surgical history        Medication list         MEDICATIONS  (STANDING):  ALBUTerol    0.083% 2.5 milliGRAM(s) Nebulizer every 8 hours  apixaban 5 milliGRAM(s) Oral every 12 hours  atorvastatin 20 milliGRAM(s) Oral at bedtime  azithromycin  IVPB 500 milliGRAM(s) IV Intermittent every 24 hours  buDESOnide  80 MICROgram(s)/formoterol 4.5 MICROgram(s) Inhaler 2 Puff(s) Inhalation two times a day  cefTRIAXone   IVPB 1 Gram(s) IV Intermittent every 24 hours  diltiazem    milliGRAM(s) Oral daily  lactobacillus acidophilus 1 Tablet(s) Oral two times a day with meals  losartan 25 milliGRAM(s) Oral daily  metoprolol succinate ER 50 milliGRAM(s) Oral two times a day  predniSONE   Tablet 20 milliGRAM(s) Oral two times a day    MEDICATIONS  (PRN):  acetaminophen   Tablet .. 650 milliGRAM(s) Oral every 6 hours PRN Temp greater or equal to 38C (100.4F), Mild Pain (1 - 3)  ALBUTerol    0.083% 2.5 milliGRAM(s) Nebulizer every 3 hours PRN Shortness of Breath and/or Wheezing  benzonatate 100 milliGRAM(s) Oral three times a day PRN Cough  guaiFENesin   Syrup  (Sugar-Free) 200 milliGRAM(s) Oral every 6 hours PRN Cough  HYDROcodone/homatropine Syrup 5 milliLiter(s) Oral two times a day PRN severe cough         Vitals log        ICU Vital Signs Last 24 Hrs  T(C): 36.4 (12 Dec 2018 05:33), Max: 36.8 (11 Dec 2018 08:32)  T(F): 97.5 (12 Dec 2018 05:33), Max: 98.2 (11 Dec 2018 08:32)  HR: 98 (12 Dec 2018 05:33) (88 - 107)  BP: 136/72 (12 Dec 2018 05:33) (110/62 - 148/72)  BP(mean): --  ABP: --  ABP(mean): --  RR: 18 (12 Dec 2018 05:33) (18 - 19)  SpO2: 95% (12 Dec 2018 05:33) (92% - 98%)           Input and Output:  I&O's Detail    11 Dec 2018 07:01  -  12 Dec 2018 07:00  --------------------------------------------------------  IN:    IV PiggyBack: 250 mL    Oral Fluid: 370 mL    Solution: 100 mL  Total IN: 720 mL    OUT:  Total OUT: 0 mL    Total NET: 720 mL          Lab Data                        11.2   10.37 )-----------( 239      ( 12 Dec 2018 06:58 )             32.9     12-12    138  |  101  |  13  ----------------------------<  152<H>  3.0<L>   |  28  |  0.79    Ca    8.3<L>      12 Dec 2018 06:58  Phos  1.5     12-12  Mg     2.6     12-12    TPro  6.6  /  Alb  2.5<L>  /  TBili  0.6  /  DBili  x   /  AST  39<H>  /  ALT  33  /  AlkPhos  99  12-12            Review of Systems	      Objective     Physical Examination    heart s1s2  lung dec BS  occ rhonchi  abd soft  cn grossly int      Pertinent Lab findings & Imaging      Leah:  NO   Adequate UO     I&O's Detail    11 Dec 2018 07:01  -  12 Dec 2018 07:00  --------------------------------------------------------  IN:    IV PiggyBack: 250 mL    Oral Fluid: 370 mL    Solution: 100 mL  Total IN: 720 mL    OUT:  Total OUT: 0 mL    Total NET: 720 mL               Discussed with:     Cultures:	        Radiology

## 2018-12-12 NOTE — PROGRESS NOTE ADULT - PROBLEM SELECTOR PROBLEM 2
RSV (acute bronchiolitis due to respiratory syncytial virus)
RSV (acute bronchiolitis due to respiratory syncytial virus)

## 2018-12-12 NOTE — PROGRESS NOTE ADULT - SUBJECTIVE AND OBJECTIVE BOX
Patient is a 84y old  Female who presents with a chief complaint of PNA (12 Dec 2018 12:25)      Patient seen in follow up for hyponatremia.     PAST MEDICAL HISTORY:  HLD (hyperlipidemia)  HTN (hypertension)  Afib    MEDICATIONS  (STANDING):  ALBUTerol    0.083% 2.5 milliGRAM(s) Nebulizer every 8 hours  apixaban 5 milliGRAM(s) Oral every 12 hours  atorvastatin 20 milliGRAM(s) Oral at bedtime  azithromycin  IVPB 500 milliGRAM(s) IV Intermittent every 24 hours  buDESOnide  80 MICROgram(s)/formoterol 4.5 MICROgram(s) Inhaler 2 Puff(s) Inhalation two times a day  cefTRIAXone   IVPB 1 Gram(s) IV Intermittent every 24 hours  diltiazem    milliGRAM(s) Oral daily  lactobacillus acidophilus 1 Tablet(s) Oral two times a day with meals  losartan 25 milliGRAM(s) Oral daily  metoprolol succinate ER 50 milliGRAM(s) Oral two times a day  predniSONE   Tablet 20 milliGRAM(s) Oral two times a day    MEDICATIONS  (PRN):  acetaminophen   Tablet .. 650 milliGRAM(s) Oral every 6 hours PRN Temp greater or equal to 38C (100.4F), Mild Pain (1 - 3)  ALBUTerol    0.083% 2.5 milliGRAM(s) Nebulizer every 3 hours PRN Shortness of Breath and/or Wheezing  benzonatate 100 milliGRAM(s) Oral three times a day PRN Cough  guaiFENesin   Syrup  (Sugar-Free) 200 milliGRAM(s) Oral every 6 hours PRN Cough  HYDROcodone/homatropine Syrup 5 milliLiter(s) Oral two times a day PRN severe cough    T(C): 36.2 (18 @ 11:54), Max: 36.8 (18 @ 08:32)  HR: 103 (18 @ 11:54) (80 - 115)  BP: 114/60 (18 @ 11:54) (101/77 - 148/72)  RR: 18 (18 @ 11:54) (18 - 19)  SpO2: 99% (18 @ 11:54) (92% - 99%)  Wt(kg): --  I&O's Detail    11 Dec 2018 07:01  -  12 Dec 2018 07:00  --------------------------------------------------------  IN:    IV PiggyBack: 250 mL    Oral Fluid: 370 mL    Solution: 100 mL  Total IN: 720 mL    OUT:  Total OUT: 0 mL    Total NET: 720 mL          PHYSICAL EXAM:  General: NAD  Respiratory: b/l air entry  Cardiovascular: S1 S2  Gastrointestinal: soft  Extremities:  no edema                          11.2   10.37 )-----------( 239      ( 12 Dec 2018 06:58 )             32.9         138  |  101  |  13  ----------------------------<  152<H>  3.0<L>   |  28  |  0.79    Ca    8.3<L>      12 Dec 2018 06:58  Phos  1.5       Mg     2.6         TPro  6.6  /  Alb  2.5<L>  /  TBili  0.6  /  DBili  x   /  AST  39<H>  /  ALT  33  /  AlkPhos  99  12        LIVER FUNCTIONS - ( 12 Dec 2018 06:58 )  Alb: 2.5 g/dL / Pro: 6.6 g/dL / ALK PHOS: 99 U/L / ALT: 33 U/L / AST: 39 U/L / GGT: x           Urinalysis Basic - ( 10 Dec 2018 20:15 )    Color: Yellow / Appearance: Slightly Turbid / S.020 / pH: x  Gluc: x / Ketone: Moderate  / Bili: Small / Urobili: 4   Blood: x / Protein: 75 mg/dL / Nitrite: Negative   Leuk Esterase: Negative / RBC: 3-5 /HPF / WBC 0-2   Sq Epi: x / Non Sq Epi: Occasional / Bacteria: Occasional        Sodium, Serum: 138 ( @ 06:58)  Sodium, Serum: 133 ( @ 05:49)  Sodium, Serum: 125 (12-10 @ 11:13)    Creatinine, Serum: 0.79 ( @ 06:58)  Creatinine, Serum: 0.66 ( @ 05:49)  Creatinine, Serum: 0.96 (12-10 @ 11:)    Potassium, Serum: 3.0 ( 06:58)  Potassium, Serum: 3.5 ( 05:49)  Potassium, Serum: 2.9 (12-10 @ 11:13)    Hemoglobin: 11.2 ( @ 06:58)  Hemoglobin: 10.8 ( 05:49)  Hemoglobin: 12.7 (12-10 @ 11:)

## 2018-12-13 ENCOUNTER — TRANSCRIPTION ENCOUNTER (OUTPATIENT)
Age: 83
End: 2018-12-13

## 2018-12-13 VITALS
DIASTOLIC BLOOD PRESSURE: 85 MMHG | SYSTOLIC BLOOD PRESSURE: 150 MMHG | OXYGEN SATURATION: 96 % | RESPIRATION RATE: 18 BRPM | TEMPERATURE: 98 F | HEART RATE: 119 BPM

## 2018-12-13 LAB
ALBUMIN SERPL ELPH-MCNC: 2.3 G/DL — LOW (ref 3.3–5)
ALP SERPL-CCNC: 87 U/L — SIGNIFICANT CHANGE UP (ref 40–120)
ALT FLD-CCNC: 37 U/L — SIGNIFICANT CHANGE UP (ref 12–78)
ANION GAP SERPL CALC-SCNC: 6 MMOL/L — SIGNIFICANT CHANGE UP (ref 5–17)
AST SERPL-CCNC: 35 U/L — SIGNIFICANT CHANGE UP (ref 15–37)
BASOPHILS # BLD AUTO: 0 K/UL — SIGNIFICANT CHANGE UP (ref 0–0.2)
BASOPHILS NFR BLD AUTO: 0 % — SIGNIFICANT CHANGE UP (ref 0–2)
BILIRUB SERPL-MCNC: 0.4 MG/DL — SIGNIFICANT CHANGE UP (ref 0.2–1.2)
BUN SERPL-MCNC: 16 MG/DL — SIGNIFICANT CHANGE UP (ref 7–23)
CALCIUM SERPL-MCNC: 8.6 MG/DL — SIGNIFICANT CHANGE UP (ref 8.5–10.1)
CHLORIDE SERPL-SCNC: 106 MMOL/L — SIGNIFICANT CHANGE UP (ref 96–108)
CO2 SERPL-SCNC: 31 MMOL/L — SIGNIFICANT CHANGE UP (ref 22–31)
CREAT SERPL-MCNC: 0.73 MG/DL — SIGNIFICANT CHANGE UP (ref 0.5–1.3)
EOSINOPHIL # BLD AUTO: 0 K/UL — SIGNIFICANT CHANGE UP (ref 0–0.5)
EOSINOPHIL NFR BLD AUTO: 0 % — SIGNIFICANT CHANGE UP (ref 0–6)
GLUCOSE SERPL-MCNC: 128 MG/DL — HIGH (ref 70–99)
HCT VFR BLD CALC: 30.9 % — LOW (ref 34.5–45)
HGB BLD-MCNC: 10.2 G/DL — LOW (ref 11.5–15.5)
LYMPHOCYTES # BLD AUTO: 1.39 K/UL — SIGNIFICANT CHANGE UP (ref 1–3.3)
LYMPHOCYTES # BLD AUTO: 12 % — LOW (ref 13–44)
MCHC RBC-ENTMCNC: 29.1 PG — SIGNIFICANT CHANGE UP (ref 27–34)
MCHC RBC-ENTMCNC: 33 GM/DL — SIGNIFICANT CHANGE UP (ref 32–36)
MCV RBC AUTO: 88.3 FL — SIGNIFICANT CHANGE UP (ref 80–100)
MONOCYTES # BLD AUTO: 0.58 K/UL — SIGNIFICANT CHANGE UP (ref 0–0.9)
MONOCYTES NFR BLD AUTO: 5 % — SIGNIFICANT CHANGE UP (ref 2–14)
NEUTROPHILS # BLD AUTO: 9.13 K/UL — HIGH (ref 1.8–7.4)
NEUTROPHILS NFR BLD AUTO: 74 % — SIGNIFICANT CHANGE UP (ref 43–77)
PLATELET # BLD AUTO: 275 K/UL — SIGNIFICANT CHANGE UP (ref 150–400)
POTASSIUM SERPL-MCNC: 4.4 MMOL/L — SIGNIFICANT CHANGE UP (ref 3.5–5.3)
POTASSIUM SERPL-SCNC: 4.4 MMOL/L — SIGNIFICANT CHANGE UP (ref 3.5–5.3)
PROT SERPL-MCNC: 5.9 G/DL — LOW (ref 6–8.3)
RBC # BLD: 3.5 M/UL — LOW (ref 3.8–5.2)
RBC # FLD: 13.6 % — SIGNIFICANT CHANGE UP (ref 10.3–14.5)
SODIUM SERPL-SCNC: 143 MMOL/L — SIGNIFICANT CHANGE UP (ref 135–145)
T3FREE SERPL-MCNC: 1.86 PG/ML — SIGNIFICANT CHANGE UP (ref 1.8–4.6)
T4 FREE SERPL-MCNC: 1.4 NG/DL — SIGNIFICANT CHANGE UP (ref 0.9–1.8)
WBC # BLD: 11.56 K/UL — HIGH (ref 3.8–10.5)
WBC # FLD AUTO: 11.56 K/UL — HIGH (ref 3.8–10.5)

## 2018-12-13 PROCEDURE — 99285 EMERGENCY DEPT VISIT HI MDM: CPT | Mod: 25

## 2018-12-13 PROCEDURE — 96365 THER/PROPH/DIAG IV INF INIT: CPT

## 2018-12-13 PROCEDURE — 85610 PROTHROMBIN TIME: CPT

## 2018-12-13 PROCEDURE — 83605 ASSAY OF LACTIC ACID: CPT

## 2018-12-13 PROCEDURE — 94640 AIRWAY INHALATION TREATMENT: CPT

## 2018-12-13 PROCEDURE — 85027 COMPLETE CBC AUTOMATED: CPT

## 2018-12-13 PROCEDURE — 85730 THROMBOPLASTIN TIME PARTIAL: CPT

## 2018-12-13 PROCEDURE — 86140 C-REACTIVE PROTEIN: CPT

## 2018-12-13 PROCEDURE — 87798 DETECT AGENT NOS DNA AMP: CPT

## 2018-12-13 PROCEDURE — 87486 CHLMYD PNEUM DNA AMP PROBE: CPT

## 2018-12-13 PROCEDURE — 99221 1ST HOSP IP/OBS SF/LOW 40: CPT

## 2018-12-13 PROCEDURE — 87086 URINE CULTURE/COLONY COUNT: CPT

## 2018-12-13 PROCEDURE — 93005 ELECTROCARDIOGRAM TRACING: CPT

## 2018-12-13 PROCEDURE — 97161 PT EVAL LOW COMPLEX 20 MIN: CPT

## 2018-12-13 PROCEDURE — 71045 X-RAY EXAM CHEST 1 VIEW: CPT

## 2018-12-13 PROCEDURE — 87633 RESP VIRUS 12-25 TARGETS: CPT

## 2018-12-13 PROCEDURE — 83735 ASSAY OF MAGNESIUM: CPT

## 2018-12-13 PROCEDURE — 84481 FREE ASSAY (FT-3): CPT

## 2018-12-13 PROCEDURE — 87040 BLOOD CULTURE FOR BACTERIA: CPT

## 2018-12-13 PROCEDURE — 81001 URINALYSIS AUTO W/SCOPE: CPT

## 2018-12-13 PROCEDURE — 82803 BLOOD GASES ANY COMBINATION: CPT

## 2018-12-13 PROCEDURE — 36415 COLL VENOUS BLD VENIPUNCTURE: CPT

## 2018-12-13 PROCEDURE — 87449 NOS EACH ORGANISM AG IA: CPT

## 2018-12-13 PROCEDURE — 83880 ASSAY OF NATRIURETIC PEPTIDE: CPT

## 2018-12-13 PROCEDURE — 84100 ASSAY OF PHOSPHORUS: CPT

## 2018-12-13 PROCEDURE — 80053 COMPREHEN METABOLIC PANEL: CPT

## 2018-12-13 PROCEDURE — 87581 M.PNEUMON DNA AMP PROBE: CPT

## 2018-12-13 PROCEDURE — 84439 ASSAY OF FREE THYROXINE: CPT

## 2018-12-13 PROCEDURE — 94760 N-INVAS EAR/PLS OXIMETRY 1: CPT

## 2018-12-13 PROCEDURE — 84443 ASSAY THYROID STIM HORMONE: CPT

## 2018-12-13 RX ORDER — ALBUTEROL 90 UG/1
0.5 AEROSOL, METERED ORAL
Qty: 1 | Refills: 0 | OUTPATIENT
Start: 2018-12-13 | End: 2018-12-19

## 2018-12-13 RX ORDER — CEFUROXIME AXETIL 250 MG
1 TABLET ORAL
Qty: 6 | Refills: 0 | OUTPATIENT
Start: 2018-12-13 | End: 2018-12-15

## 2018-12-13 RX ORDER — BUDESONIDE AND FORMOTEROL FUMARATE DIHYDRATE 160; 4.5 UG/1; UG/1
2 AEROSOL RESPIRATORY (INHALATION)
Qty: 1 | Refills: 0 | OUTPATIENT
Start: 2018-12-13 | End: 2018-12-26

## 2018-12-13 RX ADMIN — Medication 1 TABLET(S): at 07:35

## 2018-12-13 RX ADMIN — Medication 50 MILLIGRAM(S): at 06:22

## 2018-12-13 RX ADMIN — Medication 120 MILLIGRAM(S): at 06:22

## 2018-12-13 RX ADMIN — LOSARTAN POTASSIUM 25 MILLIGRAM(S): 100 TABLET, FILM COATED ORAL at 06:22

## 2018-12-13 RX ADMIN — Medication 20 MILLIGRAM(S): at 06:22

## 2018-12-13 RX ADMIN — APIXABAN 5 MILLIGRAM(S): 2.5 TABLET, FILM COATED ORAL at 06:22

## 2018-12-13 RX ADMIN — ALBUTEROL 2.5 MILLIGRAM(S): 90 AEROSOL, METERED ORAL at 08:11

## 2018-12-13 RX ADMIN — BUDESONIDE AND FORMOTEROL FUMARATE DIHYDRATE 2 PUFF(S): 160; 4.5 AEROSOL RESPIRATORY (INHALATION) at 07:35

## 2018-12-13 NOTE — DISCHARGE NOTE ADULT - MEDICATION SUMMARY - MEDICATIONS TO TAKE
I will START or STAY ON the medications listed below when I get home from the hospital:    predniSONE 5 mg oral tablet  -- 5 tab(s) oral - by mouth once a day x 2 days  4 tab(s) oral - by mouth once a day x 2 days  3 tab(s) oral - by mouth once a day x 2 days  2 tab(s) oral - by mouth once a day x 2 days  1 tab(s) oral - by mouth once a day x 2 days  -- It is very important that you take or use this exactly as directed.  Do not skip doses or discontinue unless directed by your doctor.  Obtain medical advice before taking any non-prescription drugs as some may affect the action of this medication.  Take with food or milk.    -- Indication: For RSV (acute bronchiolitis due to respiratory syncytial virus)    losartan 25 mg oral tablet  -- 1 tab(s) by mouth once a day  -- Indication: For HTN (hypertension)    dilTIAZem 120 mg/24 hours oral tablet, extended release  -- 1 tab(s) by mouth once a day  -- Indication: For HTN (hypertension)    Eliquis 5 mg oral tablet  -- 1 tab(s) by mouth 2 times a day  -- Indication: For CAD    atorvastatin 20 mg oral tablet  -- 1 tab(s) by mouth once a day  -- Indication: For HLD (hyperlipidemia)    Toprol-XL 50 mg oral tablet, extended release  -- 1 tab(s) by mouth 2 times a day  -- Indication: For HTN (hypertension)    albuterol 5 mg/mL (0.5%) inhalation solution  -- 0.5 milliliter(s) by nebulizer every 8 hours for 1 week, followed by 0.5 mL by nebulizer every 6 hours AS NEEDED afterwards.  -- For inhalation only.  It is very important that you take or use this exactly as directed.  Do not skip doses or discontinue unless directed by your doctor.  Obtain medical advice before taking any non-prescription drugs as some may affect the action of this medication.    -- Indication: For Pneumonia    Symbicort 80 mcg-4.5 mcg/inh inhalation aerosol  -- 2 puff(s) inhaled 2 times a day for 2 weeks  -- Check with your doctor before becoming pregnant.  For inhalation only.  Rinse mouth thoroughly after use.    -- Indication: For Pneumonia    cefuroxime 250 mg oral tablet  -- 1 tab(s) by mouth every 12 hours x 3 days   -- Finish all this medication unless otherwise directed by prescriber.  Medication should be taken with plenty of water.  Take with food or milk.    -- Indication: For Pneumonia    Centrum Women's oral tablet  -- 1 tab(s) by mouth once a day  -- Indication: For vitamin    Vitamin D3 1000 intl units oral tablet  -- 1 tab(s) by mouth once a day  -- Indication: For vitamin I will START or STAY ON the medications listed below when I get home from the hospital:    predniSONE 5 mg oral tablet  -- 5 tab(s) by mouth x 2 days  4 tab(s)  by mouth x 2 days  3 tab(s) by mouth x 2 days  2 tab(s) by mouth x 2 days  1 tab(s) oraly x 2 days  -- It is very important that you take or use this exactly as directed.  Do not skip doses or discontinue unless directed by your doctor.  Obtain medical advice before taking any non-prescription drugs as some may affect the action of this medication.  Take with food or milk.    -- Indication: For RSV (acute bronchiolitis due to respiratory syncytial virus)    losartan 25 mg oral tablet  -- 1 tab(s) by mouth once a day  -- Indication: For HTN (hypertension)    dilTIAZem 120 mg/24 hours oral tablet, extended release  -- 1 tab(s) by mouth once a day  -- Indication: For HTN (hypertension)    Eliquis 5 mg oral tablet  -- 1 tab(s) by mouth 2 times a day  -- Indication: For CAD    atorvastatin 20 mg oral tablet  -- 1 tab(s) by mouth once a day  -- Indication: For HLD (hyperlipidemia)    Toprol-XL 50 mg oral tablet, extended release  -- 1 tab(s) by mouth 2 times a day  -- Indication: For HTN (hypertension)    Symbicort 80 mcg-4.5 mcg/inh inhalation aerosol  -- 2 puff(s) inhaled 2 times a day for 2 weeks  -- Check with your doctor before becoming pregnant.  For inhalation only.  Rinse mouth thoroughly after use.    -- Indication: For RSV (acute bronchiolitis due to respiratory syncytial virus)    albuterol 5 mg/mL (0.5%) inhalation solution  -- 0.5 milliliter(s) by nebulizer every 8 hours for 1 week, followed by 0.5 mL by nebulizer every 6 hours AS NEEDED afterwards.  -- For inhalation only.  It is very important that you take or use this exactly as directed.  Do not skip doses or discontinue unless directed by your doctor.  Obtain medical advice before taking any non-prescription drugs as some may affect the action of this medication.    -- Indication: For RSV (acute bronchiolitis due to respiratory syncytial virus)    cefuroxime 250 mg oral tablet  -- 1 tab(s) by mouth every 12 hours x 3 days   -- Finish all this medication unless otherwise directed by prescriber.  Medication should be taken with plenty of water.  Take with food or milk.    -- Indication: For Pneumonia    Centrum Women's oral tablet  -- 1 tab(s) by mouth once a day  -- Indication: For vitamin    Vitamin D3 1000 intl units oral tablet  -- 1 tab(s) by mouth once a day  -- Indication: For vitamin

## 2018-12-13 NOTE — PROGRESS NOTE ADULT - SUBJECTIVE AND OBJECTIVE BOX
Patient is a 84y old  Female who presents with a chief complaint of PNA (12 Dec 2018 12:25)      Patient seen in follow up for hyponatremia.     PAST MEDICAL HISTORY:  HLD (hyperlipidemia)  HTN (hypertension)  Afib    MEDICATIONS  (STANDING):  ALBUTerol    0.083% 2.5 milliGRAM(s) Nebulizer every 8 hours  apixaban 5 milliGRAM(s) Oral every 12 hours  atorvastatin 20 milliGRAM(s) Oral at bedtime  azithromycin  IVPB 500 milliGRAM(s) IV Intermittent every 24 hours  buDESOnide  80 MICROgram(s)/formoterol 4.5 MICROgram(s) Inhaler 2 Puff(s) Inhalation two times a day  cefTRIAXone   IVPB 1 Gram(s) IV Intermittent every 24 hours  diltiazem    milliGRAM(s) Oral daily  lactobacillus acidophilus 1 Tablet(s) Oral two times a day with meals  losartan 25 milliGRAM(s) Oral daily  metoprolol succinate ER 50 milliGRAM(s) Oral two times a day  predniSONE   Tablet 20 milliGRAM(s) Oral two times a day    MEDICATIONS  (PRN):  acetaminophen   Tablet .. 650 milliGRAM(s) Oral every 6 hours PRN Temp greater or equal to 38C (100.4F), Mild Pain (1 - 3)  ALBUTerol    0.083% 2.5 milliGRAM(s) Nebulizer every 3 hours PRN Shortness of Breath and/or Wheezing  benzonatate 100 milliGRAM(s) Oral three times a day PRN Cough  guaiFENesin   Syrup  (Sugar-Free) 200 milliGRAM(s) Oral every 6 hours PRN Cough  HYDROcodone/homatropine Syrup 5 milliLiter(s) Oral two times a day PRN severe cough    T(C): 36.8 (12-13-18 @ 12:30), Max: 36.8 (12-12-18 @ 08:20)  HR: 119 (12-13-18 @ 12:30) (88 - 119)  BP: 150/85 (12-13-18 @ 12:30) (104/63 - 153/79)  RR: 18 (12-13-18 @ 12:30)  SpO2: 96% (12-13-18 @ 12:30)  Wt(kg): --  I&O's Detail        PHYSICAL EXAM:  General: NAD  Respiratory: b/l air entry  Cardiovascular: S1 S2  Gastrointestinal: soft  Extremities:  no edema                       LABORATORY:                        10.2   11.56 )-----------( 275      ( 13 Dec 2018 07:33 )             30.9     12-13    143  |  106  |  16  ----------------------------<  128<H>  4.4   |  31  |  0.73    Ca    8.6      13 Dec 2018 07:33  Phos  1.5     12-12  Mg     2.6     12-12    TPro  5.9<L>  /  Alb  2.3<L>  /  TBili  0.4  /  DBili  x   /  AST  35  /  ALT  37  /  AlkPhos  87  12-13    Sodium, Serum: 143 mmol/L (12-13 @ 07:33)  Sodium, Serum: 138 mmol/L (12-12 @ 06:58)    Potassium, Serum: 4.4 mmol/L (12-13 @ 07:33)  Potassium, Serum: 3.0 mmol/L (12-12 @ 06:58)    Hemoglobin: 10.2 g/dL (12-13 @ 07:33)  Hemoglobin: 11.2 g/dL (12-12 @ 06:58)  Hemoglobin: 10.8 g/dL (12-11 @ 05:49)    Creatinine, Serum 0.73 (12-13 @ 07:33)  Creatinine, Serum 0.79 (12-12 @ 06:58)  Creatinine, Serum 0.66 (12-11 @ 05:49)        LIVER FUNCTIONS - ( 13 Dec 2018 07:33 )  Alb: 2.3 g/dL / Pro: 5.9 g/dL / ALK PHOS: 87 U/L / ALT: 37 U/L / AST: 35 U/L / GGT: x

## 2018-12-13 NOTE — DISCHARGE NOTE ADULT - HOSPITAL COURSE
Pt is a 83 y/o F with PMHx of HTN, HLD, Afib (AC on Eliquis) who presents from home with 5 days of sob, weakness, nonproductive cough, and diarrhea since Saturday. Pt states she had cold-like symptoms with a dry cough starting Wednesday which developed into a fever and chills by Friday. Pt was then seen at urgent care Friday, with a temperature of 101.8. Pt was discharged with a viral illness, and given albuterol, hydroxyzine, benzonate, and an oral lidocaine viscous solution. Pt then developed diarrhea on Saturday with worsening productive cough with greenish sputum. Pt reports 5-10 episodes of diarrhea per day since Saturday. Admits to decreased appetite as well. Pt denies any current HA, CP, SOB, N/V. In the ED: VS T 98.7, , /73, RR 20, 91%RA. CBC WNL. CMP Na: 125, K 2.9, Cl 87, BUN 24, Bili 1.5, Lactate 2.2, RVP: RSV (+), CXR showing multifocal PNA. Urine legionalla Ag was negative. Pt was given 2x dose DUONEB, 1x dose azithro, 1x dose rocephin, 2L NS Bolus, 1x dose KCl 40meq. BCx sent. Pt was admitted for RSV with PNA, hyponatremia and hypokalemia. Pt was kept hydrated with IVF. Pulmonology was consulted who recs c/w emp ABX - rocephin and zithro, Albuterol NEBS TID atc and PRN, cough rx regimen - robitussin, tessalon perles, and symbicort and prednisone PO. Pt's HCTZ was held due to hyponatremia and nephrology was consulted (Dr. Ferrer). Nephrology recommended to keep off HCTZ permanently as she will be at risk for hyponatremia from it in the future. Hycodan was eventually added by pulm for severe cough. Steroid were tapered by pulm as well during pt's stay. Pt remained hypokalemic over the next day, and was again repleted with PO and IV potassium chloride. These levels improved during the latter portion of her stay. Pt's hyponatremia also improved with IVF and regular diet. Pt tolerated diet well during her stay. Pt was evaluated by endocrinology (Dr. C. Perlman) during her stay as well due to low TSH. Endo determined low tsh likely due to glucocorticoid effect, +/- euthyroid sick syndrome, and to check t3, t4 levels. PT evaluated this pt and recommended DC to home with no skilled PT needs. Pt was instructed to follow up with pulmonology and nephrology as an outpatient on discharge.     Patient improved clinically throughout hospital course. Patient seen and examined on day of discharge.    Vital Signs Last 24 Hrs  T(C): 36.7 (13 Dec 2018 08:26), Max: 36.7 (13 Dec 2018 04:54)  T(F): 98 (13 Dec 2018 08:26), Max: 98.1 (13 Dec 2018 04:54)  HR: 108 (13 Dec 2018 08:26) (90 - 114)  BP: 153/79 (13 Dec 2018 08:26) (104/63 - 153/79)  BP(mean): --  RR: 18 (13 Dec 2018 08:26) (18 - 19)  SpO2: 100% (13 Dec 2018 08:26) (93% - 100%)    Physical Exam:  General: Well developed, well nourished, NAD  HEENT: NCAT, PERRLA, EOMI bl, moist mucous membranes   Neck: Supple, nontender, no mass  Neurology: A&Ox3, nonfocal, CN II-XII grossly intact, sensation intact, no gait abnormalities   Respiratory: mild rhonchi R L fields  CV: RRR, +S1/S2, no murmurs, rubs or gallops  Abdominal: Soft, NT, ND +BSx4  Extremities: No C/C/E, + peripheral pulses  MSK: Normal ROM, no joint erythema or warmth, no joint swelling   Skin: warm, dry, normal color, no obvious rash or abnormal lesions    Patient is medically stable for discharge to home with outpatient follow up. Pt is a 83 y/o F with PMHx of HTN, HLD, Afib (AC on Eliquis) who presents from home with 5 days of sob, weakness, nonproductive cough, and diarrhea since Saturday. Pt states she had cold-like symptoms with a dry cough starting Wednesday which developed into a fever and chills by Friday. Pt was then seen at urgent care Friday, with a temperature of 101.8. Pt was discharged with a viral illness, and given albuterol, hydroxyzine, benzonate, and an oral lidocaine viscous solution. Pt then developed diarrhea on Saturday with worsening productive cough with greenish sputum. Pt reports 5-10 episodes of diarrhea per day since Saturday. Admits to decreased appetite as well. Pt denies any current HA, CP, SOB, N/V. In the ED: VS T 98.7, , /73, RR 20, 91%RA. CBC WNL. CMP Na: 125, K 2.9, Cl 87, BUN 24, Bili 1.5, Lactate 2.2, RVP: RSV (+), CXR showing multifocal PNA. Urine legionalla Ag was negative. Pt was given 2x dose DUONEB, 1x dose azithro, 1x dose rocephin, 2L NS Bolus, 1x dose KCl 40meq. BCx sent. Pt was admitted for RSV with PNA, hyponatremia and hypokalemia. Pt was kept hydrated with IVF. Pulmonology was consulted who recs c/w emp ABX - rocephin and zithro, Albuterol NEBS TID atc and PRN, cough rx regimen - robitussin, tessalon perles, and symbicort and prednisone PO. Pt's HCTZ was held due to hyponatremia and nephrology was consulted (Dr. Ferrer). Nephrology recommended to keep off HCTZ permanently as she will be at risk for hyponatremia from it in the future. Hycodan was eventually added by pulm for severe cough. Steroid were tapered by pulm as well during pt's stay. Pt remained hypokalemic over the next day, and was again repleted with PO and IV potassium chloride. These levels improved during the latter portion of her stay. Pt's hyponatremia also improved with IVF and regular diet. Pt tolerated diet well during her stay. Pt was evaluated by endocrinology (Dr. C. Perlman) during her stay as well due to low TSH. Endo determined low tsh likely due to glucocorticoid effect, +/- euthyroid sick syndrome, and to check t3, t4 levels. PT evaluated this pt and recommended DC to home with no skilled PT needs. Pt was sent with 3 day dose of Ceftin, steroid taper, albuterol nebs, and symbicort. Pt was instructed to follow up with pulmonology (Dr. Lake) and nephrology as an outpatient on discharge.     Patient improved clinically throughout hospital course. Patient seen and examined on day of discharge.    Vital Signs Last 24 Hrs  T(C): 36.7 (13 Dec 2018 08:26), Max: 36.7 (13 Dec 2018 04:54)  T(F): 98 (13 Dec 2018 08:26), Max: 98.1 (13 Dec 2018 04:54)  HR: 108 (13 Dec 2018 08:26) (90 - 114)  BP: 153/79 (13 Dec 2018 08:26) (104/63 - 153/79)  BP(mean): --  RR: 18 (13 Dec 2018 08:26) (18 - 19)  SpO2: 100% (13 Dec 2018 08:26) (93% - 100%)    Physical Exam:  General: Well developed, well nourished, NAD  HEENT: NCAT, PERRLA, EOMI bl, moist mucous membranes   Neck: Supple, nontender, no mass  Neurology: A&Ox3, nonfocal, CN II-XII grossly intact, sensation intact, no gait abnormalities   Respiratory: mild rhonchi R L fields  CV: RRR, +S1/S2, no murmurs, rubs or gallops  Abdominal: Soft, NT, ND +BSx4  Extremities: No C/C/E, + peripheral pulses  MSK: Normal ROM, no joint erythema or warmth, no joint swelling   Skin: warm, dry, normal color, no obvious rash or abnormal lesions    Patient is medically stable for discharge to home with outpatient follow up.

## 2018-12-13 NOTE — PROGRESS NOTE ADULT - SUBJECTIVE AND OBJECTIVE BOX
Date/Time Patient Seen:  		  Referring MD:   Data Reviewed	       Patient is a 84y old  Female who presents with a chief complaint of PNA (12 Dec 2018 14:26)    in bed  seen and examined  vs and meds reviewed  on room air  occ cough      Subjective/HPI     PAST MEDICAL & SURGICAL HISTORY:  HLD (hyperlipidemia)  HTN (hypertension)  Afib  No significant past surgical history        Medication list         MEDICATIONS  (STANDING):  ALBUTerol    0.083% 2.5 milliGRAM(s) Nebulizer every 8 hours  apixaban 5 milliGRAM(s) Oral every 12 hours  atorvastatin 20 milliGRAM(s) Oral at bedtime  azithromycin  IVPB 500 milliGRAM(s) IV Intermittent every 24 hours  buDESOnide  80 MICROgram(s)/formoterol 4.5 MICROgram(s) Inhaler 2 Puff(s) Inhalation two times a day  cefTRIAXone   IVPB 1 Gram(s) IV Intermittent every 24 hours  diltiazem    milliGRAM(s) Oral daily  lactobacillus acidophilus 1 Tablet(s) Oral two times a day with meals  losartan 25 milliGRAM(s) Oral daily  metoprolol succinate ER 50 milliGRAM(s) Oral two times a day  predniSONE   Tablet 20 milliGRAM(s) Oral two times a day    MEDICATIONS  (PRN):  acetaminophen   Tablet .. 650 milliGRAM(s) Oral every 6 hours PRN Temp greater or equal to 38C (100.4F), Mild Pain (1 - 3)  ALBUTerol    0.083% 2.5 milliGRAM(s) Nebulizer every 3 hours PRN Shortness of Breath and/or Wheezing  benzonatate 100 milliGRAM(s) Oral three times a day PRN Cough  guaiFENesin   Syrup  (Sugar-Free) 200 milliGRAM(s) Oral every 6 hours PRN Cough  HYDROcodone/homatropine Syrup 5 milliLiter(s) Oral two times a day PRN severe cough         Vitals log        ICU Vital Signs Last 24 Hrs  T(C): 36.7 (13 Dec 2018 08:26), Max: 36.7 (13 Dec 2018 04:54)  T(F): 98 (13 Dec 2018 08:26), Max: 98.1 (13 Dec 2018 04:54)  HR: 108 (13 Dec 2018 08:26) (90 - 114)  BP: 153/79 (13 Dec 2018 08:26) (104/63 - 153/79)  BP(mean): --  ABP: --  ABP(mean): --  RR: 18 (13 Dec 2018 08:26) (18 - 19)  SpO2: 100% (13 Dec 2018 08:26) (93% - 100%)           Input and Output:  I&O's Detail      Lab Data                        10.2   11.56 )-----------( 275      ( 13 Dec 2018 07:33 )             30.9     12-13    143  |  106  |  16  ----------------------------<  128<H>  4.4   |  31  |  0.73    Ca    8.6      13 Dec 2018 07:33  Phos  1.5     12-12  Mg     2.6     12-12    TPro  5.9<L>  /  Alb  2.3<L>  /  TBili  0.4  /  DBili  x   /  AST  35  /  ALT  37  /  AlkPhos  87  12-13            Review of Systems	      Objective     Physical Examination    heart s1s2  lung dec BS  abd soft      Pertinent Lab findings & Imaging      Leah:  NO   Adequate UO     I&O's Detail           Discussed with:     Cultures:	        Radiology

## 2018-12-13 NOTE — PROGRESS NOTE ADULT - ASSESSMENT
1.	Hyponatremia  2.	Hypokalemia  3.	Hypophosphatemia.  4.	Pneumonia    Improved sodium levels. Potassium and phos supps as needed. On Rx for pneumonia.   Will follow electrolytes and renal function trend.
1.	Hyponatremia  2.	Hypokalemia  3.	Hypophosphatemia    Improved sodium levels. Potassium and phos supps.   Will follow electrolytes and renal function trend.
Pt is a 83 y/o F with PMHx of HTN, HLD, Afib (AC on Eliquis) who presents from home with 5 days of sob, weakness, nonproductive cough, and diarrhea since Saturday, admitted for PNA.
Pt is a 85 y/o F with PMHx of HTN, HLD, Afib (AC on Eliquis) who presents from home with 5 days of sob, weakness, nonproductive cough, and diarrhea since Saturday, admitted for PNA.

## 2018-12-13 NOTE — DISCHARGE NOTE ADULT - CARE PROVIDER_API CALL
Salvatore Ferrer), Medicine  [address] [place] [address]  Phone: [phone]  Fax: [phone] Salvatore Ferrer), Medicine  [address] [facility]  [address]  [place] [address]  Phone: [phone]  Fax: [phone]    [place]), Critical Care Medicine; Internal Medicine; Pulmonary Disease  [address] [facility]  [address]  [place] [address]  Phone: [phone]  Fax: [phone]    Perlman, Craig D (DO), Medicine  [address] [place] [address]  [address] [place] [address]  Phone: [phone]  Fax: [phone]

## 2018-12-13 NOTE — DISCHARGE NOTE ADULT - CARE PROVIDERS DIRECT ADDRESSES
,DirectAddress_Unknown ,DirectAddress_Unknown,[email or web address],DirectAddress_Unknown

## 2018-12-13 NOTE — DISCHARGE NOTE ADULT - NS AS ACTIVITY OBS
Walking-Indoors allowed/Showering allowed/Walking-Outdoors allowed/No Heavy lifting/straining/Bathing allowed

## 2018-12-13 NOTE — DISCHARGE NOTE ADULT - PLAN OF CARE
resolution You came in with cough, and shortness of breath. A Chest XRAY showed signs of a pneumonia in your lungs. You were hydrated with IV fluids, and were started on antibiotics. You were evaluated by our pulmonologist who recommended steroids and continuing antibiotics. Continue to take your antibiotics as prescribed and be sure to follow up with your PCP in one week. You were found to have a viral illness. This illness is likely related to your pneumonia. Continue to take your antibiotics to cover any possible bacterial infections as well. You came in with diarrhea. This symptom is likely related to your viral illness. You were found to have low electrolytes which were repleted during your stay here. You were also kept hydrated with IV fluids. Continue to stay hydrated. You were found to have low sodium during your stay here. We started you on a regular diet and gave you sodium through your IV fluids until these levels normalized. Your HCTZ was discontinued by nephrology due to this low sodium. Be sure to STOP taking your HCTZ, and follow up with nephrology as an outpatient. You were found to have low levels of potassium during your stay. This was likely due to GI losses from diarrhea. These levels improved with the electrolytes and IV fluids we gave you. Be sure to stay hydrated. stable You were found to have low thyroid levels during your stay. You were evaluated by endocrinology who determined this was likely due to your state of illness. Be sure to follow up with your endocrinologist as an outpatient. You have Afib. Continue taking your Eliquis as prescribed, and be sure to follow up with your cardiologist as an outpatient.

## 2018-12-13 NOTE — DISCHARGE NOTE ADULT - CARE PLAN
Principal Discharge DX:	Pneumonia  Goal:	resolution  Assessment and plan of treatment:	You came in with cough, and shortness of breath. A Chest XRAY showed signs of a pneumonia in your lungs. You were hydrated with IV fluids, and were started on antibiotics. You were evaluated by our pulmonologist who recommended steroids and continuing antibiotics. Continue to take your antibiotics as prescribed and be sure to follow up with your PCP in one week.  Secondary Diagnosis:	RSV (acute bronchiolitis due to respiratory syncytial virus)  Goal:	resolution  Assessment and plan of treatment:	You were found to have a viral illness. This illness is likely related to your pneumonia. Continue to take your antibiotics to cover any possible bacterial infections as well.  Secondary Diagnosis:	Diarrhea  Goal:	resolution  Assessment and plan of treatment:	You came in with diarrhea. This symptom is likely related to your viral illness. You were found to have low electrolytes which were repleted during your stay here. You were also kept hydrated with IV fluids. Continue to stay hydrated.  Secondary Diagnosis:	Hyponatremia  Goal:	resolution  Assessment and plan of treatment:	You were found to have low sodium during your stay here. We started you on a regular diet and gave you sodium through your IV fluids until these levels normalized. Your HCTZ was discontinued by nephrology due to this low sodium. Be sure to STOP taking your HCTZ, and follow up with nephrology as an outpatient.  Secondary Diagnosis:	Hypokalemia  Goal:	resolution  Assessment and plan of treatment:	You were found to have low levels of potassium during your stay. This was likely due to GI losses from diarrhea. These levels improved with the electrolytes and IV fluids we gave you. Be sure to stay hydrated.  Secondary Diagnosis:	Low TSH level  Goal:	stable  Assessment and plan of treatment:	You were found to have low thyroid levels during your stay. You were evaluated by endocrinology who determined this was likely due to your state of illness. Be sure to follow up with your endocrinologist as an outpatient.  Secondary Diagnosis:	Afib  Goal:	stable  Assessment and plan of treatment:	You have Afib. Continue taking your Eliquis as prescribed, and be sure to follow up with your cardiologist as an outpatient.

## 2018-12-13 NOTE — PROGRESS NOTE ADULT - PROBLEM SELECTOR PROBLEM 1
RSV (acute bronchiolitis due to respiratory syncytial virus)
Pneumonia
Pneumonia

## 2018-12-13 NOTE — DISCHARGE NOTE ADULT - PATIENT PORTAL LINK FT
You can access the [facility] Patient Portal, offered by [facility], by registering with the following website: [email or web address]

## 2018-12-13 NOTE — DISCHARGE NOTE ADULT - ADDITIONAL INSTRUCTIONS
-Please follow up with your primary doctor within one week.  -Please follow up with nephrolohy outpatient (information below).  -Patient and family to set up follow up appointments.  -Continue taking your medications as directed above.  -If symptoms persist/worsen, please call your PMD or return to the ED. -Please follow up with your primary doctor within one week.  -Please follow up with nephrology and pulmonology outpatient (information below).  -Patient and family to set up follow up appointments.  -Continue taking your medications as directed above.  -If symptoms persist/worsen, please call your PMD or return to the ED.

## 2018-12-13 NOTE — DISCHARGE NOTE ADULT - SECONDARY DIAGNOSIS.
RSV (acute bronchiolitis due to respiratory syncytial virus) Diarrhea Hyponatremia Hypokalemia Low TSH level Afib

## 2018-12-13 NOTE — PROGRESS NOTE ADULT - PROBLEM SELECTOR PLAN 1
RSV infection  bronchiolitis  pna  nebs and steroids  cough rx regimen  will add hycodan PRN for severe cough  increase activity  on dual ABX for emp Tx of CAP  CXR and LABS reviewed with pt and dtr  isolation precs  will follow and monitor  cough and weakness may linger for weeks after RSV infection, discussed with Patient
resp viral infection  bronchiolitis  bronchitis  NEBS  Systemic steroids  cough rx regimen  emp ABX for multifocal PNA - 5 days of Zithro and 7 days of cephalosporin  will need CXR in 6 - 8 weeks to eval resolution of PNA  monitor vs and HD and Sat  increase activity, check Sat on exertion  will follow  cough may linger for weeks after RSV infection  discussed with patient
resp viral infection and pna  bronchospasm  nebs  steroids - will taper this am  cough rx regimen  cvs regimen  renal eval noted  isolation precs  Hycodan PRN for severe cough  pt reports feeling better this am  tolerating room air  increase activity  nutritional support  will follow  discussed with pt and daughter  will need CXR in 6 - 8 weeks to eval PNA resolution
likely 2/2 viral infection with RVP showing RSV (+), with GI symptoms  - CXR showing multifocal PNA. s/p azithro, rocephin in ED. C/w ABX.  - pt with continued diffuse rhonchi R Lung fields, diarrhea resolved  - UA showing large blood, 3-5 RBCs,0-2 granular cells. f/u urine Legionella Ag.   - pulm following (Dr. Park), recs c/w emp ABX - rocephin and zithro  - Albuterol NEBS TID atc and PRN,   - cough rx regimen - robitussin, tessalon perles  - symbicort and prednisone PO   - check MRSA screen, f/u rpt lactate   - f/u AM CBC, CMP  - droplet precautions
likely 2/2 viral infection with RVP showing RSV (+), with GI symptoms  - pt states SOB resolved, cough improving.  - CXR showing multifocal PNA. s/p azithro, rocephin in ED. C/w ABX.  - pt with continued diffuse rhonchi R Lung fields, diarrhea resolved.  - urine Legionella Ag negative   - pulm following (Dr. Park), recs CXR in 6 - 8 weeks to eval PNA resolution.  - steroids tapered to 40mg today per pulm  - Albuterol NEBS TID atc and PRN,   - cough rx regimen - Hycodan  - symbicort and prednisone PO   - check MRSA screen, f/u rpt lactate   - f/u AM CBC, CMP  - c/w droplet precautions  - PT evaluated pt recs DC --> home.

## 2018-12-15 LAB
CULTURE RESULTS: SIGNIFICANT CHANGE UP
CULTURE RESULTS: SIGNIFICANT CHANGE UP
SPECIMEN SOURCE: SIGNIFICANT CHANGE UP
SPECIMEN SOURCE: SIGNIFICANT CHANGE UP

## 2018-12-27 PROBLEM — I10 ESSENTIAL (PRIMARY) HYPERTENSION: Chronic | Status: ACTIVE | Noted: 2018-12-10

## 2018-12-27 PROBLEM — I48.91 UNSPECIFIED ATRIAL FIBRILLATION: Chronic | Status: ACTIVE | Noted: 2018-12-10

## 2018-12-27 PROBLEM — E78.5 HYPERLIPIDEMIA, UNSPECIFIED: Chronic | Status: ACTIVE | Noted: 2018-12-10

## 2018-12-31 ENCOUNTER — NON-APPOINTMENT (OUTPATIENT)
Age: 83
End: 2018-12-31

## 2018-12-31 ENCOUNTER — APPOINTMENT (OUTPATIENT)
Dept: CARDIOLOGY | Facility: CLINIC | Age: 83
End: 2018-12-31
Payer: MEDICARE

## 2018-12-31 VITALS
WEIGHT: 134 LBS | HEART RATE: 86 BPM | BODY MASS INDEX: 24.66 KG/M2 | DIASTOLIC BLOOD PRESSURE: 71 MMHG | HEIGHT: 62 IN | OXYGEN SATURATION: 95 % | SYSTOLIC BLOOD PRESSURE: 151 MMHG

## 2018-12-31 DIAGNOSIS — I49.3 VENTRICULAR PREMATURE DEPOLARIZATION: ICD-10-CM

## 2018-12-31 PROCEDURE — 99214 OFFICE O/P EST MOD 30 MIN: CPT

## 2018-12-31 PROCEDURE — 93000 ELECTROCARDIOGRAM COMPLETE: CPT

## 2018-12-31 NOTE — HISTORY OF PRESENT ILLNESS
[FreeTextEntry1] : I saw Viktoriya Ortiz in the office today for cardiac followup. She is an 84-year-old white female who has a history of self-limited supraventricular tachycardia, hypertension, high cholesterol. She's been intolerant to statin drugs because of muscle cramps. She is physically active and exercises twice a week. She has no palpitations, shortness of breath, lightheadedness, or chest discomfort. She measures her blood pressure home regularly getting readings in the 130/70 range.\par \par She was recently admitted to [place] while playing view with bibasilar pneumonia 12/13/18. She history with antibiotics, systemic steroids and cough medication. Hospital course was uncomplicated.In the hospital her ECG showed atrial fibrillation with controlled ventricular rate.\par \par The patient had an echocardiogram in our office 7/17 that showed an ejection fraction of 66% with some mild diastolic dysfunction. She had stress test in 2013 that showed no ischemia. She had abdominal sonogram in March of 2011 that showed no aneurysm. Her carotid Doppler 5/18 showed mild plaque.\par \par Her visit in December 2016 she was in rapid atrial fibrillation at a rate of 130 beats per minute. She had been on Toprol 50 mg twice a day. Diltiazem 120 mg once a day and repeat ECG in January showed sinus rhythm. Since then she's had no clinical arrhythmia.\par \par Blood work performed 4/18 demonstrated cholesterol 169, triglycerides 143, HDL 50, LDL 95. CBC and CMP were normal\par \par She has been feeling a little I had at home sometimes her blood pressure runs a little on the low side. Heart rate has been consistently in the 60s. She does exercise and denies any exertional symptoms.\par \par ECG shows atrial fibrillation with a controlled ventricular rate.

## 2018-12-31 NOTE — REVIEW OF SYSTEMS
[Eyeglasses] : currently wearing eyeglasses [Sinus Pressure] : sinus pressure [Incontinence] : incontinence [Joint Pain] : joint pain [Finger Pain] : pain in the finger [Ankle Pain] : ankle pain [Skin Lesions] : skin lesion(s): [Anxiety] : anxiety [Negative] : Gastrointestinal [Fever] : no fever [Headache] : no headache [Chills] : no chills [Feeling Fatigued] : not feeling fatigued [Blurry Vision] : no blurred vision [Seeing Double (Diplopia)] : no diplopia [Earache] : no earache [Sore Throat] : no sore throat [Dysuria] : no dysuria [Dizziness] : no dizziness [Convulsions] : no convulsions [Confusion] : no confusion was observed [Excessive Thirst] : no polydipsia [Easy Bleeding] : no tendency for easy bleeding [Easy Bruising] : no tendency for easy bruising

## 2018-12-31 NOTE — PHYSICAL EXAM
[General Appearance - Well Developed] : well developed [Normal Appearance] : normal appearance [Well Groomed] : well groomed [General Appearance - Well Nourished] : well nourished [No Deformities] : no deformities [General Appearance - In No Acute Distress] : no acute distress [Normal Conjunctiva] : the conjunctiva exhibited no abnormalities [Normal Oral Mucosa] : normal oral mucosa [Normal Jugular Venous A Waves Present] : normal jugular venous A waves present [Normal Jugular Venous V Waves Present] : normal jugular venous V waves present [No Jugular Venous Chairez A Waves] : no jugular venous chairez A waves [] : no respiratory distress [Respiration, Rhythm And Depth] : normal respiratory rhythm and effort [Exaggerated Use Of Accessory Muscles For Inspiration] : no accessory muscle use [Auscultation Breath Sounds / Voice Sounds] : lungs were clear to auscultation bilaterally [Bowel Sounds] : normal bowel sounds [Abdomen Soft] : soft [Abdomen Tenderness] : non-tender [Abnormal Walk] : normal gait [Gait - Sufficient For Exercise Testing] : the gait was sufficient for exercise testing [Nail Clubbing] : no clubbing of the fingernails [Cyanosis, Localized] : no localized cyanosis [Skin Color & Pigmentation] : normal skin color and pigmentation [Skin Turgor] : normal skin turgor [Oriented To Time, Place, And Person] : oriented to person, place, and time [Impaired Insight] : insight and judgment were intact [No Anxiety] : not feeling anxious [Normal] : normal [No Precordial Heave] : no precordial heave was noted [Normal Rate] : normal [Normal S1] : normal S1 [Normal S2] : normal S2 [No Murmur] : no murmurs heard [2+] : left 2+ [No Abnormalities] : the abdominal aorta was not enlarged and no bruit was heard [No Pitting Edema] : no pitting edema present [Apical Thrill] : no thrill palpable at the apex [S3] : no S3 [S4] : no S4 [Right Carotid Bruit] : no bruit heard over the right carotid [Left Carotid Bruit] : no bruit heard over the left carotid [Right Femoral Bruit] : no bruit heard over the right femoral artery [Left Femoral Bruit] : no bruit heard over the left femoral artery

## 2018-12-31 NOTE — REASON FOR VISIT
[Follow-Up - Clinic] : a clinic follow-up of [Carotid Artery Stenosis] : carotid stenosis [Hyperlipidemia] : hyperlipidemia [Hypertension] : hypertension [Palpitations] : palpitations [Supraventricular Tachycardia] : supraventricular tachycardia [FreeTextEntry1] : EDVINCs

## 2018-12-31 NOTE — DISCUSSION/SUMMARY
[FreeTextEntry1] : Clinically the patient is fully recovered from pneumonia. She has lost about 9 pounds. This puts her weight below 60 kg and she would now be on reduced dose of Eliquis. She is in atrial fibrillation but has no symptoms. We discussed rate versus rhythm control. Since she has no symptoms she has elected to go for rate control.\par \par She'll reduce her Eliquis to 2.5 mg one twice a day. She'll stay on her other medications. If her weight starts to go up she would call me. If she has any symptoms she will call me. Otherwise I will see her in one month.

## 2019-01-25 ENCOUNTER — RX RENEWAL (OUTPATIENT)
Age: 84
End: 2019-01-25

## 2019-01-28 ENCOUNTER — APPOINTMENT (OUTPATIENT)
Dept: CARDIOLOGY | Facility: CLINIC | Age: 84
End: 2019-01-28
Payer: MEDICARE

## 2019-01-28 VITALS
WEIGHT: 136 LBS | SYSTOLIC BLOOD PRESSURE: 147 MMHG | HEART RATE: 96 BPM | OXYGEN SATURATION: 95 % | DIASTOLIC BLOOD PRESSURE: 79 MMHG | HEIGHT: 62 IN | BODY MASS INDEX: 25.03 KG/M2

## 2019-01-28 PROCEDURE — 99214 OFFICE O/P EST MOD 30 MIN: CPT

## 2019-01-28 NOTE — HISTORY OF PRESENT ILLNESS
[FreeTextEntry1] : I saw Viktoriya Ortiz in the office today for cardiac followup. She is an 84-year-old white female who has a history of self-limited supraventricular tachycardia, hypertension, high cholesterol. She's been intolerant to statin drugs because of muscle cramps. She is physically active and exercises twice a week. She has no palpitations, shortness of breath, lightheadedness, or chest discomfort. She measures her blood pressure home regularly getting readings in the 130/70 range.\par \par She was recently admitted to [place] while playing view with bibasilar pneumonia 12/13/18. She history with antibiotics, systemic steroids and cough medication. Hospital course was uncomplicated.In the hospital her ECG showed atrial fibrillation with controlled ventricular rate.\par \par The patient had an echocardiogram in our office 7/17 that showed an ejection fraction of 66% with some mild diastolic dysfunction. She had stress test in 2013 that showed no ischemia. She had abdominal sonogram in March of 2011 that showed no aneurysm. Her carotid Doppler 5/18 showed mild plaque.\par \par Her visit in December 2016 she was in rapid atrial fibrillation at a rate of 130 beats per minute. She had been on Toprol 50 mg twice a day. Diltiazem 120 mg once a day and repeat ECG in January showed sinus rhythm. Since then she's had no clinical arrhythmia.\par \par Blood work performed 4/18 demonstrated cholesterol 169, triglycerides 143, HDL 50, LDL 95. CBC and CMP were normal\par \par Blood pressure at home has been good and again she is absent no symptoms of atrial fibrillation.\par

## 2019-01-28 NOTE — DISCUSSION/SUMMARY
[FreeTextEntry1] : I suspect she is now chronic atrial fibrillation. At her age without symptoms  I would leave her with rate contro.l Her weight at the present time is 61 kg and she will go back on the 5 mg of the Eliquis twice a day. Her other medication will remain the same.\par \par She'll come back for an echocardiogram. If she has any problems or symptoms she will call me. Otherwise I will see her in 3 months.

## 2019-03-05 ENCOUNTER — APPOINTMENT (OUTPATIENT)
Dept: CARDIOLOGY | Facility: CLINIC | Age: 84
End: 2019-03-05

## 2019-03-27 ENCOUNTER — RX RENEWAL (OUTPATIENT)
Age: 84
End: 2019-03-27

## 2019-04-30 ENCOUNTER — APPOINTMENT (OUTPATIENT)
Dept: CARDIOLOGY | Facility: CLINIC | Age: 84
End: 2019-04-30
Payer: MEDICARE

## 2019-04-30 VITALS
DIASTOLIC BLOOD PRESSURE: 78 MMHG | OXYGEN SATURATION: 97 % | SYSTOLIC BLOOD PRESSURE: 146 MMHG | HEIGHT: 62 IN | WEIGHT: 136 LBS | BODY MASS INDEX: 25.03 KG/M2 | HEART RATE: 84 BPM

## 2019-04-30 PROCEDURE — 99214 OFFICE O/P EST MOD 30 MIN: CPT

## 2019-04-30 NOTE — DISCUSSION/SUMMARY
[FreeTextEntry1] : The patient continues to do well without any signs or symptoms of active heart disease. She has rate controlled atrial fibrillation and is on eliquis. She had no bleeding events.\par \par She is physically active and has no chest pain, shortness of breath, lightheadedness, or palpitation.\par \par She'll have blood work in your office in May. I would appreciate your sending a copy to my office for my review. She also will schedule an echocardiogram. If all is well I will see her in September. She knows to call the office if any problems arise.

## 2019-04-30 NOTE — HISTORY OF PRESENT ILLNESS
[FreeTextEntry1] : I saw Viktoriya Ortiz in the office today for cardiac followup. She is an 84-year-old white female who has a history of self-limited supraventricular tachycardia, hypertension, high cholesterol. She's been intolerant to statin drugs because of muscle cramps. She is physically active and exercises twice a week. She has no palpitations, shortness of breath, lightheadedness, or chest discomfort. She measures her blood pressure home regularly getting readings in the 130/70 range.\par \par She was recently admitted to [place] while playing view with bibasilar pneumonia 12/13/18. She history with antibiotics, systemic steroids and cough medication. Hospital course was uncomplicated.In the hospital her ECG showed atrial fibrillation with controlled ventricular rate.\par \par The patient had an echocardiogram in our office 7/17 that showed an ejection fraction of 66% with some mild diastolic dysfunction. She had stress test in 2013 that showed no ischemia. She had abdominal sonogram in March of 2011 that showed no aneurysm. Her carotid Doppler 5/18 showed mild plaque.\par \par Her visit in December 2016 she was in rapid atrial fibrillation at a rate of 130 beats per minute. She had been on Toprol 50 mg twice a day. Diltiazem 120 mg once a day and repeat ECG in January showed sinus rhythm. Since then she's had no clinical arrhythmia.\par \par Blood work performed 4/18 demonstrated cholesterol 169, triglycerides 143, HDL 50, LDL 95. CBC and CMP were normal\par \par Blood pressure at home has been good and again she has no symptoms of atrial fibrillation.\par

## 2019-07-02 ENCOUNTER — APPOINTMENT (OUTPATIENT)
Dept: CARDIOLOGY | Facility: CLINIC | Age: 84
End: 2019-07-02
Payer: MEDICARE

## 2019-07-02 PROCEDURE — 93306 TTE W/DOPPLER COMPLETE: CPT

## 2019-08-27 ENCOUNTER — RX RENEWAL (OUTPATIENT)
Age: 84
End: 2019-08-27

## 2020-01-29 ENCOUNTER — RX RENEWAL (OUTPATIENT)
Age: 85
End: 2020-01-29

## 2020-02-18 ENCOUNTER — NON-APPOINTMENT (OUTPATIENT)
Age: 85
End: 2020-02-18

## 2020-02-18 ENCOUNTER — APPOINTMENT (OUTPATIENT)
Dept: CARDIOLOGY | Facility: CLINIC | Age: 85
End: 2020-02-18
Payer: MEDICARE

## 2020-02-18 VITALS
OXYGEN SATURATION: 97 % | WEIGHT: 140 LBS | DIASTOLIC BLOOD PRESSURE: 76 MMHG | BODY MASS INDEX: 25.76 KG/M2 | SYSTOLIC BLOOD PRESSURE: 128 MMHG | HEIGHT: 62 IN | HEART RATE: 73 BPM

## 2020-02-18 PROCEDURE — 99214 OFFICE O/P EST MOD 30 MIN: CPT

## 2020-02-18 PROCEDURE — 93000 ELECTROCARDIOGRAM COMPLETE: CPT

## 2020-02-18 NOTE — REASON FOR VISIT
[Follow-Up - Clinic] : a clinic follow-up of [Hypertension] : hypertension [Hyperlipidemia] : hyperlipidemia [Carotid Artery Stenosis] : carotid stenosis [Palpitations] : palpitations [Supraventricular Tachycardia] : supraventricular tachycardia [FreeTextEntry1] : EDVINCs

## 2020-02-18 NOTE — HISTORY OF PRESENT ILLNESS
[FreeTextEntry1] : I saw Viktoriya Ortiz in the office today for cardiac followup. She is an 85-year-old white female who has a history of self-limited supraventricular tachycardia, hypertension, high cholesterol. She's been intolerant to statin drugs because of muscle cramps. She is physically active and exercises twice a week. She has no palpitations, shortness of breath, lightheadedness, or chest discomfort. She measures her blood pressure home regularly getting readings in the 130/70 range.\par \par She was recently admitted to [place] while playing view with bibasilar pneumonia 12/13/18. She history with antibiotics, systemic steroids and cough medication. Hospital course was uncomplicated.In the hospital her ECG showed atrial fibrillation with controlled ventricular rate.\par \par The patient had an echocardiogram in our office 7/19 that showed an ejection fraction of 60% with some mild diastolic dysfunction. Mild MR and mild-mod TR She had stress test in 2013 that showed no ischemia. She had abdominal sonogram in March of 2011 that showed no aneurysm. Her carotid Doppler 5/18 showed mild plaque.\par \par Her visit in December 2016 she was in rapid atrial fibrillation at a rate of 130 beats per minute. She had been on Toprol 50 mg twice a day. Diltiazem 120 mg once a day and repeat ECG in January showed sinus rhythm. Since then she's had no clinical arrhythmia.\par \par Blood work 5/19 demonstrated a total cholesterol 146, triglycerides 67, HDL 55, and LDL 78. ECG shows atrial fibrillation with a controlled ventricular rate. There are no acute changes.\par \par Blood pressure at home has been good and again she has no symptoms of atrial fibrillation.\par

## 2020-02-18 NOTE — PHYSICAL EXAM
[General Appearance - Well Developed] : well developed [Normal Appearance] : normal appearance [Well Groomed] : well groomed [General Appearance - Well Nourished] : well nourished [No Deformities] : no deformities [General Appearance - In No Acute Distress] : no acute distress [Normal Conjunctiva] : the conjunctiva exhibited no abnormalities [Normal Oral Mucosa] : normal oral mucosa [Normal Jugular Venous A Waves Present] : normal jugular venous A waves present [No Jugular Venous Chairez A Waves] : no jugular venous chairez A waves [Normal Jugular Venous V Waves Present] : normal jugular venous V waves present [Respiration, Rhythm And Depth] : normal respiratory rhythm and effort [] : no respiratory distress [Exaggerated Use Of Accessory Muscles For Inspiration] : no accessory muscle use [Auscultation Breath Sounds / Voice Sounds] : lungs were clear to auscultation bilaterally [Bowel Sounds] : normal bowel sounds [Abdomen Soft] : soft [Abdomen Tenderness] : non-tender [Abnormal Walk] : normal gait [Gait - Sufficient For Exercise Testing] : the gait was sufficient for exercise testing [Nail Clubbing] : no clubbing of the fingernails [Cyanosis, Localized] : no localized cyanosis [Skin Color & Pigmentation] : normal skin color and pigmentation [Skin Turgor] : normal skin turgor [Oriented To Time, Place, And Person] : oriented to person, place, and time [Impaired Insight] : insight and judgment were intact [No Anxiety] : not feeling anxious [Normal] : normal [No Precordial Heave] : no precordial heave was noted [Normal Rate] : normal [Normal S1] : normal S1 [Normal S2] : normal S2 [No Murmur] : no murmurs heard [2+] : right 2+ [No Abnormalities] : the abdominal aorta was not enlarged and no bruit was heard [No Pitting Edema] : no pitting edema present [Apical Thrill] : no thrill palpable at the apex [S3] : no S3 [Right Carotid Bruit] : no bruit heard over the right carotid [Left Carotid Bruit] : no bruit heard over the left carotid [S4] : no S4 [Left Femoral Bruit] : no bruit heard over the left femoral artery [Right Femoral Bruit] : no bruit heard over the right femoral artery

## 2020-02-18 NOTE — DISCUSSION/SUMMARY
[FreeTextEntry1] : The patient continues to do well without any signs or symptoms of active heart disease. Her pressure and heart rate would go good control. She is active and has no chest pain or shortness of breath.\par \par She'll continue with her present medication. I would appreciate your sending a copy of her most recent bloodwork for my review.\par \par We reviewed her medication, recent echo, and most recent bloodwork, and I answered all of her questions.

## 2020-02-18 NOTE — REVIEW OF SYSTEMS
[Eyeglasses] : currently wearing eyeglasses [Sinus Pressure] : sinus pressure [Incontinence] : incontinence [Joint Pain] : joint pain [Finger Pain] : pain in the finger [Ankle Pain] : ankle pain [Skin Lesions] : skin lesion(s): [Anxiety] : anxiety [Negative] : Respiratory [Fever] : no fever [Headache] : no headache [Chills] : no chills [Feeling Fatigued] : not feeling fatigued [Blurry Vision] : no blurred vision [Seeing Double (Diplopia)] : no diplopia [Earache] : no earache [Sore Throat] : no sore throat [Dysuria] : no dysuria [Dizziness] : no dizziness [Convulsions] : no convulsions [Confusion] : no confusion was observed [Excessive Thirst] : no polydipsia [Easy Bleeding] : no tendency for easy bleeding [Easy Bruising] : no tendency for easy bruising

## 2020-02-26 ENCOUNTER — RX RENEWAL (OUTPATIENT)
Age: 85
End: 2020-02-26

## 2020-03-23 ENCOUNTER — RX RENEWAL (OUTPATIENT)
Age: 85
End: 2020-03-23

## 2020-04-23 ENCOUNTER — RX RENEWAL (OUTPATIENT)
Age: 85
End: 2020-04-23

## 2021-02-11 ENCOUNTER — RX RENEWAL (OUTPATIENT)
Age: 86
End: 2021-02-11

## 2021-03-19 ENCOUNTER — RX RENEWAL (OUTPATIENT)
Age: 86
End: 2021-03-19

## 2021-03-22 ENCOUNTER — NON-APPOINTMENT (OUTPATIENT)
Age: 86
End: 2021-03-22

## 2021-03-22 ENCOUNTER — APPOINTMENT (OUTPATIENT)
Dept: CARDIOLOGY | Facility: CLINIC | Age: 86
End: 2021-03-22
Payer: MEDICARE

## 2021-03-22 VITALS
BODY MASS INDEX: 26.68 KG/M2 | HEIGHT: 62 IN | DIASTOLIC BLOOD PRESSURE: 79 MMHG | OXYGEN SATURATION: 95 % | WEIGHT: 145 LBS | SYSTOLIC BLOOD PRESSURE: 128 MMHG | HEART RATE: 96 BPM

## 2021-03-22 PROCEDURE — 93000 ELECTROCARDIOGRAM COMPLETE: CPT

## 2021-03-22 PROCEDURE — 99214 OFFICE O/P EST MOD 30 MIN: CPT

## 2021-03-22 NOTE — HISTORY OF PRESENT ILLNESS
[FreeTextEntry1] : I saw Viktoriya Ortiz in the office today for cardiac followup. She is an 86-year-old white female who has a history of self-limited supraventricular tachycardia, hypertension, high cholesterol. She's been intolerant to statin drugs because of muscle cramps. She is physically active and exercises twice a week. She has no palpitations, shortness of breath, lightheadedness, or chest discomfort. She measures her blood pressure home regularly getting readings in the 130/70 range.\par \par She was recently admitted to [place] while playing view with bibasilar pneumonia 12/13/18. She history with antibiotics, systemic steroids and cough medication. Hospital course was uncomplicated.In the hospital her ECG showed atrial fibrillation with controlled ventricular rate.\par \par The patient had an echocardiogram in our office 7/19 that showed an ejection fraction of 60% with some mild diastolic dysfunction. Mild MR and mild-mod TR She had stress test in 2013 that showed no ischemia. She had abdominal sonogram in March of 2011 that showed no aneurysm. Her carotid Doppler 5/18 showed mild plaque.\par \par Her visit in December 2016 she was in rapid atrial fibrillation at a rate of 130 beats per minute. She had been on Toprol 50 mg twice a day. Diltiazem 120 mg once a day and repeat ECG in January showed sinus rhythm. Since then she's had no clinical arrhythmia.\par \par Blood work 10/19 demonstrated a total cholesterol 146, triglycerides 67, HDL 55, and LDL 78. ECG shows atrial fibrillation with a controlled ventricular rate. There are no acute changes.\par \par Blood pressure at home has been good and again she has no symptoms of atrial fibrillation.\par \par ECG shows atrial fibrillation without change.\par

## 2021-03-22 NOTE — DISCUSSION/SUMMARY
[FreeTextEntry1] : Patient's cardiac status is stable.  She has rate controlled atrial fibrillation.  On her medication her blood pressure and heart rate are well controlled and she has no symptoms of chest pain, shortness of breath, palpitation.  She remains physically active.  She has gained almost 10 pounds and needs to watch this.\par \par She will get blood work from your office I would appreciate a copy.  Repeat her echocardiogram in July.  We did go over her medications I answered her questions.  If all is well I will see her again in 1 year.

## 2021-04-16 ENCOUNTER — RX RENEWAL (OUTPATIENT)
Age: 86
End: 2021-04-16

## 2021-06-01 ENCOUNTER — RX RENEWAL (OUTPATIENT)
Age: 86
End: 2021-06-01

## 2021-11-30 NOTE — ED ADULT NURSE NOTE - NSSISCREENINGQ3_ED_A_ED
no distress noted, all needs attended and met, call light within reach, family was here 
earlier, caregiver at bedside No

## 2022-01-18 ENCOUNTER — RX RENEWAL (OUTPATIENT)
Age: 87
End: 2022-01-18

## 2022-01-31 ENCOUNTER — RX RENEWAL (OUTPATIENT)
Age: 87
End: 2022-01-31

## 2022-02-02 ENCOUNTER — APPOINTMENT (OUTPATIENT)
Dept: CARDIOLOGY | Facility: CLINIC | Age: 87
End: 2022-02-02

## 2022-02-28 ENCOUNTER — EMERGENCY (EMERGENCY)
Facility: HOSPITAL | Age: 87
LOS: 1 days | Discharge: HOME | End: 2022-02-28
Attending: EMERGENCY MEDICINE | Admitting: EMERGENCY MEDICINE
Payer: MEDICARE

## 2022-02-28 VITALS
HEART RATE: 91 BPM | WEIGHT: 139.99 LBS | DIASTOLIC BLOOD PRESSURE: 85 MMHG | OXYGEN SATURATION: 97 % | TEMPERATURE: 99 F | HEIGHT: 62 IN | SYSTOLIC BLOOD PRESSURE: 173 MMHG | RESPIRATION RATE: 18 BRPM

## 2022-02-28 VITALS
OXYGEN SATURATION: 99 % | TEMPERATURE: 99 F | SYSTOLIC BLOOD PRESSURE: 144 MMHG | HEART RATE: 84 BPM | RESPIRATION RATE: 18 BRPM | DIASTOLIC BLOOD PRESSURE: 89 MMHG

## 2022-02-28 LAB
ALBUMIN SERPL ELPH-MCNC: 3.7 G/DL — SIGNIFICANT CHANGE UP (ref 3.3–5)
ALP SERPL-CCNC: 154 U/L — HIGH (ref 40–120)
ALT FLD-CCNC: 26 U/L — SIGNIFICANT CHANGE UP (ref 12–78)
ANION GAP SERPL CALC-SCNC: 7 MMOL/L — SIGNIFICANT CHANGE UP (ref 5–17)
APPEARANCE UR: CLEAR — SIGNIFICANT CHANGE UP
AST SERPL-CCNC: 20 U/L — SIGNIFICANT CHANGE UP (ref 15–37)
BASOPHILS # BLD AUTO: 0.02 K/UL — SIGNIFICANT CHANGE UP (ref 0–0.2)
BASOPHILS NFR BLD AUTO: 0.2 % — SIGNIFICANT CHANGE UP (ref 0–2)
BILIRUB SERPL-MCNC: 0.7 MG/DL — SIGNIFICANT CHANGE UP (ref 0.2–1.2)
BILIRUB UR-MCNC: NEGATIVE — SIGNIFICANT CHANGE UP
BUN SERPL-MCNC: 16 MG/DL — SIGNIFICANT CHANGE UP (ref 7–23)
CALCIUM SERPL-MCNC: 9.5 MG/DL — SIGNIFICANT CHANGE UP (ref 8.5–10.1)
CHLORIDE SERPL-SCNC: 107 MMOL/L — SIGNIFICANT CHANGE UP (ref 96–108)
CO2 SERPL-SCNC: 26 MMOL/L — SIGNIFICANT CHANGE UP (ref 22–31)
COLOR SPEC: YELLOW — SIGNIFICANT CHANGE UP
CREAT SERPL-MCNC: 1.1 MG/DL — SIGNIFICANT CHANGE UP (ref 0.5–1.3)
DIFF PNL FLD: NEGATIVE — SIGNIFICANT CHANGE UP
EGFR: 49 ML/MIN/1.73M2 — LOW
EOSINOPHIL # BLD AUTO: 0.22 K/UL — SIGNIFICANT CHANGE UP (ref 0–0.5)
EOSINOPHIL NFR BLD AUTO: 2.7 % — SIGNIFICANT CHANGE UP (ref 0–6)
GLUCOSE SERPL-MCNC: 141 MG/DL — HIGH (ref 70–99)
GLUCOSE UR QL: NEGATIVE — SIGNIFICANT CHANGE UP
HCT VFR BLD CALC: 43.5 % — SIGNIFICANT CHANGE UP (ref 34.5–45)
HGB BLD-MCNC: 14.4 G/DL — SIGNIFICANT CHANGE UP (ref 11.5–15.5)
IMM GRANULOCYTES NFR BLD AUTO: 0.7 % — SIGNIFICANT CHANGE UP (ref 0–1.5)
KETONES UR-MCNC: NEGATIVE — SIGNIFICANT CHANGE UP
LEUKOCYTE ESTERASE UR-ACNC: ABNORMAL
LYMPHOCYTES # BLD AUTO: 1.41 K/UL — SIGNIFICANT CHANGE UP (ref 1–3.3)
LYMPHOCYTES # BLD AUTO: 17.3 % — SIGNIFICANT CHANGE UP (ref 13–44)
MAGNESIUM SERPL-MCNC: 2.3 MG/DL — SIGNIFICANT CHANGE UP (ref 1.6–2.6)
MCHC RBC-ENTMCNC: 29.8 PG — SIGNIFICANT CHANGE UP (ref 27–34)
MCHC RBC-ENTMCNC: 33.1 GM/DL — SIGNIFICANT CHANGE UP (ref 32–36)
MCV RBC AUTO: 89.9 FL — SIGNIFICANT CHANGE UP (ref 80–100)
MONOCYTES # BLD AUTO: 0.48 K/UL — SIGNIFICANT CHANGE UP (ref 0–0.9)
MONOCYTES NFR BLD AUTO: 5.9 % — SIGNIFICANT CHANGE UP (ref 2–14)
NEUTROPHILS # BLD AUTO: 5.94 K/UL — SIGNIFICANT CHANGE UP (ref 1.8–7.4)
NEUTROPHILS NFR BLD AUTO: 73.2 % — SIGNIFICANT CHANGE UP (ref 43–77)
NITRITE UR-MCNC: NEGATIVE — SIGNIFICANT CHANGE UP
NRBC # BLD: 0 /100 WBCS — SIGNIFICANT CHANGE UP (ref 0–0)
PH UR: 6.5 — SIGNIFICANT CHANGE UP (ref 5–8)
PLATELET # BLD AUTO: 249 K/UL — SIGNIFICANT CHANGE UP (ref 150–400)
POTASSIUM SERPL-MCNC: 3.8 MMOL/L — SIGNIFICANT CHANGE UP (ref 3.5–5.3)
POTASSIUM SERPL-SCNC: 3.8 MMOL/L — SIGNIFICANT CHANGE UP (ref 3.5–5.3)
PROT SERPL-MCNC: 7.2 G/DL — SIGNIFICANT CHANGE UP (ref 6–8.3)
PROT UR-MCNC: 15
RAPID RVP RESULT: SIGNIFICANT CHANGE UP
RBC # BLD: 4.84 M/UL — SIGNIFICANT CHANGE UP (ref 3.8–5.2)
RBC # FLD: 13.3 % — SIGNIFICANT CHANGE UP (ref 10.3–14.5)
SARS-COV-2 RNA SPEC QL NAA+PROBE: SIGNIFICANT CHANGE UP
SODIUM SERPL-SCNC: 140 MMOL/L — SIGNIFICANT CHANGE UP (ref 135–145)
SP GR SPEC: 1 — LOW (ref 1.01–1.02)
TROPONIN I, HIGH SENSITIVITY RESULT: 7.9 NG/L — SIGNIFICANT CHANGE UP
TSH SERPL-MCNC: 1.44 UIU/ML — SIGNIFICANT CHANGE UP (ref 0.36–3.74)
UROBILINOGEN FLD QL: NEGATIVE — SIGNIFICANT CHANGE UP
WBC # BLD: 8.13 K/UL — SIGNIFICANT CHANGE UP (ref 3.8–10.5)
WBC # FLD AUTO: 8.13 K/UL — SIGNIFICANT CHANGE UP (ref 3.8–10.5)

## 2022-02-28 PROCEDURE — 83735 ASSAY OF MAGNESIUM: CPT

## 2022-02-28 PROCEDURE — 80053 COMPREHEN METABOLIC PANEL: CPT

## 2022-02-28 PROCEDURE — 36415 COLL VENOUS BLD VENIPUNCTURE: CPT

## 2022-02-28 PROCEDURE — 81001 URINALYSIS AUTO W/SCOPE: CPT

## 2022-02-28 PROCEDURE — 93010 ELECTROCARDIOGRAM REPORT: CPT

## 2022-02-28 PROCEDURE — 99285 EMERGENCY DEPT VISIT HI MDM: CPT | Mod: 25

## 2022-02-28 PROCEDURE — 99285 EMERGENCY DEPT VISIT HI MDM: CPT

## 2022-02-28 PROCEDURE — 84484 ASSAY OF TROPONIN QUANT: CPT

## 2022-02-28 PROCEDURE — 82962 GLUCOSE BLOOD TEST: CPT

## 2022-02-28 PROCEDURE — 70450 CT HEAD/BRAIN W/O DYE: CPT | Mod: MA

## 2022-02-28 PROCEDURE — 0225U NFCT DS DNA&RNA 21 SARSCOV2: CPT

## 2022-02-28 PROCEDURE — 85025 COMPLETE CBC W/AUTO DIFF WBC: CPT

## 2022-02-28 PROCEDURE — 93005 ELECTROCARDIOGRAM TRACING: CPT

## 2022-02-28 PROCEDURE — 84443 ASSAY THYROID STIM HORMONE: CPT

## 2022-02-28 PROCEDURE — 70450 CT HEAD/BRAIN W/O DYE: CPT | Mod: 26,MA

## 2022-02-28 NOTE — CONSULT NOTE ADULT - ASSESSMENT
Assessment/Plan: 87 F with Hx of Afib (on Eliquis), HTN, and HLD presented to the ED c/o weakness x 2 days s/p fall on Saturday, followed by near syncope, associated with palpitations    Near Syncope s/p Fall  - Does not appear to be real syncope or cardiac in nature.  First episode appeared mechanical.  Second episode likely vagal  - She has full recollection of events prior to fall.  No LOC  - Her EKG showed Afib, rate is controlled, and non-ischemic  - She had a normal TTE in our office in 2019.  She follows with Dr. Becerra  - CT head and labs unremarkable except for mildly elevated alk phos, though, asymptomatic  - Her hsT is negative  - Encouraged to hydrate herself as her fluid intake is very limited  - Contineu home meds  - If no further w/u needed, she can be discharged from cardiac standpoint.  Recommend to follow up with Dr. Becerra as well as her PCP     If admitted, we will follow    Vernell Guzman DNP, NP-C  Cardiology  Spectra #[phone]

## 2022-02-28 NOTE — ED PROVIDER NOTE - PATIENT PORTAL LINK FT
You can access the FollowMyHealth Patient Portal offered by [facility] by registering at the following website: [email or web address]. By joining [facility]’s FollowMyHealth portal, you will also be able to view your health information using other applications (apps) compatible with our system.

## 2022-02-28 NOTE — ED ADULT NURSE NOTE - INTERVENTIONS DEFINITIONS
[place] to call system/Call bell, personal items and telephone within reach/Instruct patient to call for assistance/Room bathroom lighting operational/Non-slip footwear when patient is off stretcher/Physically safe environment: no spills, clutter or unnecessary equipment/Stretcher in lowest position, wheels locked, appropriate side rails in place/Provide visual cue, wrist band, yellow gown, etc./Monitor gait and stability/Monitor for mental status changes and reorient to person, place, and time/Review medications for side effects contributing to fall risk/Reinforce activity limits and safety measures with patient and family/Provide visual clues: red socks

## 2022-02-28 NOTE — ED PROVIDER NOTE - NSFOLLOWUPINSTRUCTIONS_ED_ALL_ED_FT
stay well hydrated. Rest. Take all medications as previously prescribed.    FOLLOW UP WITH ____DR SALAZAR______ AS DIRECTED.  YOU WERE GIVEN COPIES OF ALL LABS AND IMAGING RESULTS FROM YOUR ER VISIT--PLEASE TAKE THEM WITH YOU TO YOUR APPOINTMENT.   IF NEEDED, CALL [phone] TO FIND A PRIMARY CARE PHYSICIAN.  OR CALL [phone] TO MAKE AN APPOINTMENT WITH THE MEDICAL CLINIC.   If this happens again return immediately to the emergency department. RETURN TO THE ER FOR ANY WORSENING SYMPTOMS.

## 2022-02-28 NOTE — ED PROVIDER NOTE - ATTENDING CONTRIBUTION TO CARE
86 y/o F sent in by cardiology, Dr. Becerra, for further evaluation after weakness x 2 episodes 2 days ago.  pt states she felt like her legs gave out and she started to have palpitations.  pt with hx of afib on Eliquis.  pt denies LOC, HA, head trauma    PE: unremarkable    cardiac workup, head CT, cardio consult

## 2022-02-28 NOTE — ED PROVIDER NOTE - CARE PROVIDER_API CALL
Shift assessment completed, see flow sheet. Patient is A&O x4. Medications administered. Patient denies further needs at this time. Call light within reach. Will continue to monitor. Mike Becerra)  Cardiovascular Disease; Internal Medicine  [address] [place] [address]  Phone: [phone]  Fax: [phone]  Follow Up Time: 1-3 Days

## 2022-02-28 NOTE — CONSULT NOTE ADULT - ATTENDING COMMENTS
Likely vagal syncope.  Less likely TIA.  OK to d/c from cardiac point of view with no changes in her medication regimen.  To follow up with Dr. Becerra.

## 2022-02-28 NOTE — ED PROVIDER NOTE - CLINICAL SUMMARY MEDICAL DECISION MAKING FREE TEXT BOX
88 y/o F with near synocpal episodes x 2 saturday no chest pain ro soB< did feel palps prior to second episodes, hx a fib, currently with no complaints, A&Ox4 no focal neuro deficits S1s2 no m/r/g, plan= labs EKG ct head and cardiology consult

## 2022-02-28 NOTE — ED PROVIDER NOTE - PHYSICAL EXAMINATION
PE:   GEN: Awake, alert, interactive, NAD, non-toxic appearing.   HEAD: Atraumatic  CARDIAC: Reg rate and rhythm, S1,S2, no murmur/rub/gallop. Strong central and peripheral pulses, Brisk cap refill, no evident pedal edema.   RESP: No distress noted. L/S clear = Bilat without accessory muscle use, wheeze, rhonchi, rales.   ABD: soft, supple, non-tender, no guarding. BS x 4, normoactive.   NEURO: AOx3, CN II-XII grossly intact without focal deficit.   MSK: Moving all extremities with no apparent deformities.   SKIN: Warm, dry, normal color, without apparent rashes.

## 2022-02-28 NOTE — ED PROVIDER NOTE - OBJECTIVE STATEMENT
58 y/o F with PMH HTN, HLD, a fib (diagnosed about one year ago) on Eliquis presents to ED as recommended by her cardiologist Dr Hernan guidry evaluation. Pt states she was at a wake on Saturday when she suddenly fell to the ground onto her L knee. Did not pass out or hit head. Did not have any chest pain or SOB or palps. Later that day her so came to get her to go to dinner when she felt dizzy, weak and palpitations. Denies sob or chest pain. sharonan reynoso snot recall but as per family who was with her she "lost muscle control" and was disoriented x15 min. Her family member took her BP at that time and it was 176/98. Had appt today with her cardiologist but called them to make them aware and was advised to come to ED instead. pt feeling well at this time. Denies any complaints. No fever chills n/v/d cough SOB chets pain. Pt is fully vaccinated x 3 for covid with moderna vaccine.     PCP Dr Cheney  Cardiology- D rBreen

## 2022-02-28 NOTE — ED ADULT TRIAGE NOTE - CHIEF COMPLAINT QUOTE
fell 2 days ago has had episodes where she became dizzy light headed episodes of elevated blood pressure

## 2022-02-28 NOTE — ED ADULT NURSE NOTE - OBJECTIVE STATEMENT
Received pt c/o syncopal episode today.  Pt states she didn't feel well on Saturday and had two falls which were preceded by dizziness.  Pt states she fell again today and was dizzy.  Unknown head injury.  Pt currently ZIMMERMAN with equal and adequate strength +RAY, no facial asymmetry noted.   Denies paresthesias/dizziness at present.  Denies cp/sob/palpitations.  Respirations even and unlabored pt denies recent fevers/illness. BN

## 2022-02-28 NOTE — CONSULT NOTE ADULT - SUBJECTIVE AND OBJECTIVE BOX
[facility] - Johny Becerra, Emma Rodriguez, Mariah, Isaac, Lacey De Jesus  Office Number: [phone]    Initial Consult Note    CHIEF COMPLAINT: Patient is a 87y old  Female who presents with a chief complaint of     HPI:  This is an 87 F with Hx of Afib (on Eliquis), HTN, and HLD presented to the ED c/o weakness x 2 days associated with palpitations.  Follows with Dr. Becerra    PAST MEDICAL & SURGICAL HISTORY:  Afib    HTN (hypertension)    HLD (hyperlipidemia)    No significant past surgical history    SOCIAL HISTORY:  No tobacco, ethanol, or drug abuse.  FAMILY HISTORY:  Family history of myocardial infarction (Father, Mother)    Family history of type 2 diabetes mellitus (Sibling)    Family history of abdominal aortic aneurysm (AAA) (Father)    No family history of acute MI or sudden cardiac death.  MEDICATIONS  (STANDING):    MEDICATIONS  (PRN):    Allergies    No Known Allergies    Intolerances    REVIEW OF SYSTEMS:  CONSTITUTIONAL: No weakness, fevers or chills  EYES/ENT: No visual changes;  No vertigo or throat pain   NECK: No pain or stiffness  RESPIRATORY: No cough, wheezing, hemoptysis; No shortness of breath  CARDIOVASCULAR: No chest pain or palpitations  GASTROINTESTINAL: No abdominal pain. No nausea, vomiting, or hematemesis; No diarrhea or constipation. No melena or hematochezia.  GENITOURINARY: No dysuria, frequency or hematuria  NEUROLOGICAL: No numbness or weakness  SKIN: No itching or rash  All other review of systems is negative unless indicated above  VITAL SIGNS:   Vital Signs Last 24 Hrs  T(C): 37.1 (28 Feb 2022 11:51), Max: 37.1 (28 Feb 2022 11:51)  T(F): 98.8 (28 Feb 2022 11:51), Max: 98.8 (28 Feb 2022 11:51)  HR: 87 (28 Feb 2022 12:17) (87 - 91)  BP: 173/85 (28 Feb 2022 11:51) (173/85 - 173/85)  BP(mean): --  RR: 18 (28 Feb 2022 11:51) (18 - 18)  SpO2: 97% (28 Feb 2022 11:51) (97% - 97%)  I&O's Summary    On Exam:  Constitutional: NAD, alert and oriented x 3  Lungs:  Non-labored, breath sounds are clear bilaterally, No wheezing, rales or rhonchi  Cardiovascular: RRR.  S1 and S2 positive.  No murmurs, rubs, gallops or clicks  Gastrointestinal: Bowel Sounds present, soft, nontender.   Lymph: No peripheral edema. No cervical lymphadenopathy.  Neurological: Alert, no focal deficits  Skin: No rashes or ulcers   Psych:  Mood & affect appropriate.    LABS: All Labs Reviewed:                        14.4   8.13  )-----------( 249      ( 28 Feb 2022 12:41 )             43.5     28 Feb 2022 12:41    140    |  107    |  16     ----------------------------<  141    3.8     |  26     |  1.10     Ca    9.5        28 Feb 2022 12:41  Mg     2.3       28 Feb 2022 12:41    TPro  7.2    /  Alb  3.7    /  TBili  0.7    /  DBili  x      /  AST  20     /  ALT  26     /  AlkPhos  154    28 Feb 2022 12:41    02-28 @ 12:41  TSH: 1.44    RADIOLOGY:    EXAM:  XR CHEST AP OR PA 1V                          PROCEDURE DATE:  12/10/2018      INTERPRETATION:  Clinical information: Cough.    Technique: Frontal view of the chest.    Comparison: None available.    Findings: Patchy bibasilar opacities are noted. There is bony vascular   congestion. The heart size is normal. There are mild multilevel   degenerative changes of the thoracic spine.    IMPRESSION: Multifocal pneumonia. Recommend imaging follow-up to   resolution.    KENRICK CALHOUN M.D., ATTENDING RADIOLOGIST  This document has been electronically signed. Dec 10 2018 12:30PM    EKG:    Assessment/Plan:    Vernell Guzman DNP, NP-C  Cardiology  Spectra #[phone]       [facility] - Johny Becerra, Jennifer, Emma, Mariah, Isaac, Neal, Lacey  Office Number: [phone]    Initial Consult Note    CHIEF COMPLAINT: Patient is a 87y old  Female who presents with a chief complaint of     HPI:  This is an 87 F with Hx of Afib (on Eliquis), HTN, and HLD presented to the ED c/o weakness x 2 days associated with palpitations.  Follows with Dr. Becerra.  Patient states, on Saturday afternoon, she felt weak and her knees gave out.  However, at night of same day, she felt dizzy and almost fell.  Denies dizziness on first episode.  Denies LOC on either.  However, per daughter, she was confused for at least 15 mins the back to normal.  Her BP was elevated to systolic 170's initially but improved to 130's when rechecked.  Denies any weakness, change in vision, dysarthria, chest pain, SOB or ARAUZ.  Denies fever, chills, sick contact, diarrhea, abdominal pain or change in appetite.  However, admits to not drinking enough water.  Daughter states, she only drinks 2 8oz glasses of water.  In the ED, she states, she is completely back to her baseline.  Admits to be ambulating to  with no symptoms.    EKG showed Afib with rate-controlled, no ischemic changes.  CT head with no acute pathology.  Labs unremarkable except for mildly elevated Alk phos (154).  hsT negative.    PAST MEDICAL & SURGICAL HISTORY:  Afib    HTN (hypertension)    HLD (hyperlipidemia)    No significant past surgical history    SOCIAL HISTORY:  No tobacco, ethanol, or drug abuse.  FAMILY HISTORY:  Family history of myocardial infarction (Father, Mother)    Family history of type 2 diabetes mellitus (Sibling)    Family history of abdominal aortic aneurysm (AAA) (Father)    No family history of acute MI or sudden cardiac death.  MEDICATIONS  (STANDING):    MEDICATIONS  (PRN):    Allergies    No Known Allergies    Intolerances    REVIEW OF SYSTEMS:  CONSTITUTIONAL: No weakness, fevers or chills  EYES/ENT: No visual changes;  No vertigo or throat pain   NECK: No pain or stiffness  RESPIRATORY: No cough, wheezing, hemoptysis; No shortness of breath  CARDIOVASCULAR: No chest pain or palpitations  GASTROINTESTINAL: No abdominal pain. No nausea, vomiting, or hematemesis; No diarrhea or constipation. No melena or hematochezia.  GENITOURINARY: No dysuria, frequency or hematuria  NEUROLOGICAL: No numbness or weakness  SKIN: No itching or rash  All other review of systems is negative unless indicated above  VITAL SIGNS:   Vital Signs Last 24 Hrs  T(C): 37.1 (28 Feb 2022 11:51), Max: 37.1 (28 Feb 2022 11:51)  T(F): 98.8 (28 Feb 2022 11:51), Max: 98.8 (28 Feb 2022 11:51)  HR: 87 (28 Feb 2022 12:17) (87 - 91)  BP: 173/85 (28 Feb 2022 11:51) (173/85 - 173/85)  BP(mean): --  RR: 18 (28 Feb 2022 11:51) (18 - 18)  SpO2: 97% (28 Feb 2022 11:51) (97% - 97%)  I&O's Summary    On Exam:  Constitutional: NAD, alert and oriented x 3  Lungs:  Non-labored, breath sounds are clear bilaterally, No wheezing, rales or rhonchi  Cardiovascular: RRR.  S1 and S2 positive.  No murmurs, rubs, gallops or clicks  Gastrointestinal: Bowel Sounds present, soft, nontender.   Lymph: No peripheral edema. No cervical lymphadenopathy.  Neurological: Alert, no focal deficits  Skin: No rashes or ulcers   Psych:  Mood & affect appropriate.    LABS: All Labs Reviewed:                        14.4   8.13  )-----------( 249      ( 28 Feb 2022 12:41 )             43.5     28 Feb 2022 12:41    140    |  107    |  16     ----------------------------<  141    3.8     |  26     |  1.10     Ca    9.5        28 Feb 2022 12:41  Mg     2.3       28 Feb 2022 12:41    TPro  7.2    /  Alb  3.7    /  TBili  0.7    /  DBili  x      /  AST  20     /  ALT  26     /  AlkPhos  154    28 Feb 2022 12:41    02-28 @ 12:41  TSH: 1.44    RADIOLOGY:    EXAM:  XR CHEST AP OR PA 1V                          PROCEDURE DATE:  12/10/2018      INTERPRETATION:  Clinical information: Cough.    Technique: Frontal view of the chest.    Comparison: None available.    Findings: Patchy bibasilar opacities are noted. There is bony vascular   congestion. The heart size is normal. There are mild multilevel   degenerative changes of the thoracic spine.    IMPRESSION: Multifocal pneumonia. Recommend imaging follow-up to   resolution.    KENRICK CALHOUN M.D., ATTENDING RADIOLOGIST  This document has been electronically signed. Dec 10 2018 12:30PM    EKG: Afib with no ischemic changes, rate-controlled

## 2022-02-28 NOTE — ED PROVIDER NOTE - NSICDXFAMILYHX_GEN_ALL_CORE_FT
FAMILY HISTORY:  Father  Still living? Unknown  Family history of abdominal aortic aneurysm (AAA), Age at diagnosis: Age Unknown  Family history of myocardial infarction, Age at diagnosis: Age Unknown    Mother  Still living? Unknown  Family history of myocardial infarction, Age at diagnosis: Age Unknown    Sibling  Still living? Unknown  Family history of type 2 diabetes mellitus, Age at diagnosis: Age Unknown

## 2022-02-28 NOTE — ED PROVIDER NOTE - PROGRESS NOTE DETAILS
Pt seen and evaluated by cardiology who cleared pt for dc with fu with Cardiology. Reevaluated patient at bedside.  Patient feeling much improved.  Discussed the results of all diagnostic testing in ED and copies of all reports given.   An opportunity to ask questions was given.  Discussed the importance of prompt, close medical follow-up.  Patient will return with any changes, concerns or persistent / worsening symptoms.  Understanding of all instructions verbalized.

## 2022-03-11 RX ORDER — ALBUTEROL SULFATE 5 MG/ML
(5 MG/ML) SOLUTION, NON-ORAL INHALATION
Refills: 0 | Status: ACTIVE | COMMUNITY

## 2022-03-11 RX ORDER — ATORVASTATIN CALCIUM 10 MG/1
10 TABLET, FILM COATED ORAL
Qty: 90 | Refills: 1 | Status: DISCONTINUED | COMMUNITY
Start: 2018-02-03 | End: 2022-03-11

## 2022-03-17 ENCOUNTER — APPOINTMENT (OUTPATIENT)
Dept: CARDIOLOGY | Facility: CLINIC | Age: 87
End: 2022-03-17
Payer: MEDICARE

## 2022-03-17 VITALS
DIASTOLIC BLOOD PRESSURE: 70 MMHG | SYSTOLIC BLOOD PRESSURE: 154 MMHG | BODY MASS INDEX: 25.26 KG/M2 | OXYGEN SATURATION: 96 % | HEIGHT: 62 IN | WEIGHT: 137.25 LBS | HEART RATE: 80 BPM

## 2022-03-17 PROCEDURE — 99214 OFFICE O/P EST MOD 30 MIN: CPT

## 2022-03-17 NOTE — DISCUSSION/SUMMARY
[FreeTextEntry1] : Going to increase the Eliquis to 5 mg twice a day and she will stop the aspirin.  She will come back for repeat echocardiogram and carotid Doppler.  Blood pressure today is slightly elevated she will start monitoring her pressure at home and bring her machine with her in a month for follow-up visit.  If she does have any bleeding issues she would call me.\par \par This was discussed with the patient and her daughter, and i answered all the questions.

## 2022-03-17 NOTE — REVIEW OF SYSTEMS
[Joint Pain] : joint pain [Finger Pain] : pain in the finger [Ankle Swelling] : ankle swelling [Negative] : Genitourinary [Rash] : no rash [Dizziness] : no dizziness [Depression] : no depression [Easy Bleeding] : no tendency for easy bleeding [Easy Bruising] : no tendency for easy bruising [FreeTextEntry4] : sinus pressure [FreeTextEntry8] : incontinence [de-identified] : anxiety

## 2022-03-17 NOTE — HISTORY OF PRESENT ILLNESS
[FreeTextEntry1] : I saw Viktoriya Ortiz in the office today for cardiac followup. She is an 87-year-old white female who has a history of self-limited supraventricular tachycardia, hypertension, high cholesterol. She's been intolerant to statin drugs because of muscle cramps. She is physically active and exercises twice a week. She has no palpitations, shortness of breath, lightheadedness, or chest discomfort. She measures her blood pressure home regularly getting readings in the 130/70 range.\par \par She was recently admitted to [place] while playing view with bibasilar pneumonia 12/13/18. She history with antibiotics, systemic steroids and cough medication. Hospital course was uncomplicated.In the hospital her ECG showed atrial fibrillation with controlled ventricular rate.\par \par The patient had an echocardiogram in our office 7/19 that showed an ejection fraction of 60% with some mild diastolic dysfunction. Mild MR and mild-mod TR She had stress test in 2013 that showed no ischemia. She had abdominal sonogram in March of 2011 that showed no aneurysm. Her carotid Doppler 5/18 showed mild plaque.\par \par Her visit in December 2016 she was in rapid atrial fibrillation at a rate of 130 beats per minute. She had been on Toprol 50 mg twice a day. Diltiazem 120 mg once a day and repeat ECG in January showed sinus rhythm. Since then she's had no clinical arrhythmia.\par \par Blood work 10/19 demonstrated a total cholesterol 146, triglycerides 67, HDL 55, and LDL 78. ECG shows atrial fibrillation with a controlled ventricular rate. There are no acute changes.\par \par Blood pressure at home has been good and again she has no symptoms of atrial fibrillation.\par \par Recent symtoms of TIA.  Saw Dr. 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  She has been on Eliquis 2.5 mg twice a day because her weight has been about 62 kg.  Because of her age we erred on the lower dose.\par \par \par

## 2022-03-28 ENCOUNTER — APPOINTMENT (OUTPATIENT)
Dept: CARDIOLOGY | Facility: CLINIC | Age: 87
End: 2022-03-28
Payer: MEDICARE

## 2022-03-28 PROCEDURE — 93880 EXTRACRANIAL BILAT STUDY: CPT

## 2022-03-28 PROCEDURE — 93306 TTE W/DOPPLER COMPLETE: CPT

## 2022-04-22 ENCOUNTER — APPOINTMENT (OUTPATIENT)
Dept: CARDIOLOGY | Facility: CLINIC | Age: 87
End: 2022-04-22
Payer: MEDICARE

## 2022-04-22 VITALS
HEART RATE: 76 BPM | BODY MASS INDEX: 25.95 KG/M2 | HEIGHT: 62 IN | DIASTOLIC BLOOD PRESSURE: 83 MMHG | WEIGHT: 141 LBS | SYSTOLIC BLOOD PRESSURE: 161 MMHG | OXYGEN SATURATION: 96 %

## 2022-04-22 DIAGNOSIS — I48.0 PAROXYSMAL ATRIAL FIBRILLATION: ICD-10-CM

## 2022-04-22 DIAGNOSIS — E78.00 PURE HYPERCHOLESTEROLEMIA, UNSPECIFIED: ICD-10-CM

## 2022-04-22 DIAGNOSIS — G45.9 TRANSIENT CEREBRAL ISCHEMIC ATTACK, UNSPECIFIED: ICD-10-CM

## 2022-04-22 PROCEDURE — 99214 OFFICE O/P EST MOD 30 MIN: CPT

## 2022-04-22 NOTE — PHYSICAL EXAM
[General Appearance - Well Developed] : well developed [Normal Appearance] : normal appearance [Well Groomed] : well groomed [General Appearance - Well Nourished] : well nourished [No Deformities] : no deformities [Normal Conjunctiva] : the conjunctiva exhibited no abnormalities [General Appearance - In No Acute Distress] : no acute distress [Normal Oral Mucosa] : normal oral mucosa [Normal Jugular Venous A Waves Present] : normal jugular venous A waves present [Normal Jugular Venous V Waves Present] : normal jugular venous V waves present [No Jugular Venous Chairez A Waves] : no jugular venous chairez A waves [] : no respiratory distress [Respiration, Rhythm And Depth] : normal respiratory rhythm and effort [Exaggerated Use Of Accessory Muscles For Inspiration] : no accessory muscle use [Auscultation Breath Sounds / Voice Sounds] : lungs were clear to auscultation bilaterally [Bowel Sounds] : normal bowel sounds [Abdomen Soft] : soft [Abdomen Tenderness] : non-tender [Gait - Sufficient For Exercise Testing] : the gait was sufficient for exercise testing [Abnormal Walk] : normal gait [Nail Clubbing] : no clubbing of the fingernails [Cyanosis, Localized] : no localized cyanosis [Skin Color & Pigmentation] : normal skin color and pigmentation [Skin Turgor] : normal skin turgor [Oriented To Time, Place, And Person] : oriented to person, place, and time [Impaired Insight] : insight and judgment were intact [No Anxiety] : not feeling anxious [Normal] : normal [No Precordial Heave] : no precordial heave was noted [Normal Rate] : normal [Normal S1] : normal S1 [Normal S2] : normal S2 [No Murmur] : no murmurs heard [2+] : left 2+ [No Abnormalities] : the abdominal aorta was not enlarged and no bruit was heard [No Pitting Edema] : no pitting edema present [Apical Thrill] : no thrill palpable at the apex [S3] : no S3 [S4] : no S4 [Right Carotid Bruit] : no bruit heard over the right carotid [Left Carotid Bruit] : no bruit heard over the left carotid [Right Femoral Bruit] : no bruit heard over the right femoral artery [Left Femoral Bruit] : no bruit heard over the left femoral artery

## 2022-04-22 NOTE — HISTORY OF PRESENT ILLNESS
[FreeTextEntry1] : I saw Viktoriya Ortiz in the office today for cardiac followup. She is an 87-year-old white female who has a history of self-limited supraventricular tachycardia, hypertension, high cholesterol. She's been intolerant to statin drugs because of muscle cramps. She is physically active and exercises twice a week. She has no palpitations, shortness of breath, lightheadedness, or chest discomfort. She measures her blood pressure home regularly getting readings in the 130/70 range.\par \par She was recently admitted to [place] while playing view with bibasilar pneumonia 12/13/18. She history with antibiotics, systemic steroids and cough medication. Hospital course was uncomplicated.In the hospital her ECG showed atrial fibrillation with controlled ventricular rate.\par \par The patient had an echocardiogram in our office 3/22 that showed an ejection fraction of 60-65% with some mild diastolic dysfunction. Mild MR, AI,  and mild-mod TR  PAP=41. She had stress test in 2013 that showed no ischemia. She had abdominal sonogram in March of 2011 that showed no aneurysm. Her carotid Doppler 3/22 showed mild-mod plaque.\par \par Her visit in December 2016 she was in rapid atrial fibrillation at a rate of 130 beats per minute. She had been on Toprol 50 mg twice a day. Diltiazem 120 mg once a day and repeat ECG in January showed sinus rhythm. Since then she's had no clinical arrhythmia.\par \par Blood work 10/19 demonstrated a total cholesterol 146, triglycerides 67, HDL 55, and LDL 78. ECG shows atrial fibrillation with a controlled ventricular rate. There are no acute changes.\par \par Blood pressure at home has been good and again she has no symptoms of atrial fibrillation. 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  She has been on Eliquis 2.5 mg twice a day because her weight has been about 62 kg.  Because of her age we erred on the lower dose.\par \par Recent symtoms of TIA.  Saw Dr. Diaz who added low dose aspirin.  Now on Eliquis 5 mg twice a day and will stop the aspirin.  So far she is doing well.\par

## 2022-04-22 NOTE — REVIEW OF SYSTEMS
[Joint Pain] : joint pain [Finger Pain] : pain in the finger [Ankle Swelling] : ankle swelling [Negative] : Genitourinary [Rash] : no rash [Dizziness] : no dizziness [Depression] : no depression [Easy Bleeding] : no tendency for easy bleeding [Easy Bruising] : no tendency for easy bruising [FreeTextEntry4] : sinus pressure [FreeTextEntry8] : incontinence [de-identified] : anxiety

## 2022-07-11 ENCOUNTER — NON-APPOINTMENT (OUTPATIENT)
Age: 87
End: 2022-07-11

## 2022-07-11 ENCOUNTER — APPOINTMENT (OUTPATIENT)
Dept: CARDIOLOGY | Facility: CLINIC | Age: 87
End: 2022-07-11

## 2022-07-11 VITALS
BODY MASS INDEX: 25.76 KG/M2 | HEART RATE: 59 BPM | SYSTOLIC BLOOD PRESSURE: 148 MMHG | DIASTOLIC BLOOD PRESSURE: 78 MMHG | WEIGHT: 140 LBS | OXYGEN SATURATION: 91 % | HEIGHT: 62 IN

## 2022-07-11 PROCEDURE — 93000 ELECTROCARDIOGRAM COMPLETE: CPT

## 2022-07-11 PROCEDURE — 99214 OFFICE O/P EST MOD 30 MIN: CPT

## 2022-11-17 NOTE — ED ADULT NURSE NOTE - PRIMARY CARE PROVIDER
Nutrition:  See dietician instructions. Feels like things are going really well after starting the medication. As well take two meal replacement shakes a day, continue your low carbohydrate eating plan.     Physical Activity:  See exercise physiologist instructions. Currently patient is doing a total of 5 days a week of exercise on treadmill for 20 to 30 min.  2-3 days of week she is doing muscle training exercises. Advised to increase activity and maintain a habit of activity by finding sports, workouts, and exercises you actually enjoy to build up to 6-7 days a week.     Sleep:  Patient getting 6 to 8 hours of sleep per night. Has a hard time with consistent sleep 2/2 children.     Psych/Spirtual/Emotional:   Feeling stressed with the medications. Patient is coping with alone time vs walking on the treadmill vs reading. Discussed better options for stress management.    Medications/Vitamins:   Taking Iron, Prenatals, and Vit D    Has done well with Mounjaro 2.5mg but now unable to access medication off-label   Will attempt to switch to Wegovy 0.5mg. If coverage unavailable trial of Ozempic for PCOS vs Liraglutide.       PMH/Physical:  Labs ordered for patient.            unk

## 2023-01-12 ENCOUNTER — APPOINTMENT (OUTPATIENT)
Dept: CARDIOLOGY | Facility: CLINIC | Age: 88
End: 2023-01-12

## 2023-02-13 ENCOUNTER — APPOINTMENT (OUTPATIENT)
Dept: CARDIOLOGY | Facility: CLINIC | Age: 88
End: 2023-02-13
Payer: MEDICARE

## 2023-02-13 ENCOUNTER — NON-APPOINTMENT (OUTPATIENT)
Age: 88
End: 2023-02-13

## 2023-02-13 VITALS
OXYGEN SATURATION: 98 % | BODY MASS INDEX: 25.03 KG/M2 | DIASTOLIC BLOOD PRESSURE: 69 MMHG | SYSTOLIC BLOOD PRESSURE: 146 MMHG | HEIGHT: 62 IN | HEART RATE: 89 BPM | WEIGHT: 136 LBS

## 2023-02-13 PROCEDURE — 93000 ELECTROCARDIOGRAM COMPLETE: CPT

## 2023-02-13 PROCEDURE — 99214 OFFICE O/P EST MOD 30 MIN: CPT

## 2023-02-13 NOTE — HISTORY OF PRESENT ILLNESS
[FreeTextEntry1] : I saw Viktoriya Ortiz in the office today for cardiac followup.  She was last seen in the office about 6 months ago.\par \par She is now 88 years old, with a history of hypertension, hyperlipidemia and history of self-limited SVT.  She has been relatively intolerant to statin drugs because of myalgias.  She has a history of mild valvular heart disease.  She has mild to moderate carotid plaque.  She has had paroxysmal atrial fibrillation, on Eliquis.  Aspirin was added because of recurrent events, and eventually stopped.  She has mild dementia, and is on memantine.  She continues to live alone.\par \par She presents to the office today having been feeling well.  She reports no chest discomfort or shortness of breath suggestive of angina.  She denies orthopnea, PND and lower extremity edema.  She denies palpitations, dizziness and syncope.  She reports that her blood pressure and cholesterol have been well controlled.

## 2023-02-13 NOTE — DISCUSSION/SUMMARY
[FreeTextEntry1] : Viktoriya seems to be doing well from a cardiovascular perspective.  She does not have any important symptoms of concern.  She remains anticoagulated, and remains in rate controlled atrial fibrillation.\par \par I reviewed her most recent blood work from November 2021.  This revealed an LDL cholesterol of 88.  Carotid ultrasound was performed in March 2022.  This revealed mild to moderate bilateral atherosclerosis.  Echo was performed in March 2022.  This revealed an ejection fraction of 60 to 65%, with moderate mitral regurgitation and mild aortic regurgitation.  She had mild pulmonary hypertension at that time.\par \par She will schedule an echocardiogram.  I will be in contact with her to discuss the results.  She does not have any symptoms of concern at this time.  She has had no falls or bleeding.  We will keep an eye on that.\par \par She will see me in 6 months.

## 2023-02-13 NOTE — REVIEW OF SYSTEMS
[Joint Pain] : joint pain [Finger Pain] : pain in the finger [Ankle Swelling] : ankle swelling [Negative] : Genitourinary [Rash] : no rash [Dizziness] : no dizziness [Depression] : no depression [Easy Bleeding] : no tendency for easy bleeding [Easy Bruising] : no tendency for easy bruising [FreeTextEntry4] : sinus pressure [FreeTextEntry8] : incontinence [de-identified] : anxiety

## 2023-03-06 ENCOUNTER — APPOINTMENT (OUTPATIENT)
Dept: CARDIOLOGY | Facility: CLINIC | Age: 88
End: 2023-03-06
Payer: MEDICARE

## 2023-03-06 PROCEDURE — 93306 TTE W/DOPPLER COMPLETE: CPT

## 2023-03-09 NOTE — DISCUSSION/SUMMARY
[FreeTextEntry1] : Clinically the patient is doing well.  She has paroxysmal atrial fibrillation without symptoms.  At her age would not recommend antiarrhythmic therapy or ablation.  She will stay on her present medication.\par \par We did go over the results of her echocardiogram and  carotid Doppler.  We discussed her medicines and I answered all of her questions.  If all is well she will come back in 3 months. Spironolactone Pregnancy And Lactation Text: This medication can cause feminization of the male fetus and should be avoided during pregnancy. The active metabolite is also found in breast milk.

## 2023-08-08 ENCOUNTER — APPOINTMENT (OUTPATIENT)
Dept: CARDIOLOGY | Facility: CLINIC | Age: 88
End: 2023-08-08
Payer: MEDICARE

## 2023-08-08 VITALS — DIASTOLIC BLOOD PRESSURE: 65 MMHG | SYSTOLIC BLOOD PRESSURE: 145 MMHG

## 2023-08-08 VITALS
WEIGHT: 143 LBS | HEART RATE: 72 BPM | DIASTOLIC BLOOD PRESSURE: 70 MMHG | HEIGHT: 62 IN | OXYGEN SATURATION: 97 % | BODY MASS INDEX: 26.31 KG/M2 | SYSTOLIC BLOOD PRESSURE: 145 MMHG

## 2023-08-08 PROCEDURE — 93000 ELECTROCARDIOGRAM COMPLETE: CPT

## 2023-08-08 PROCEDURE — 99214 OFFICE O/P EST MOD 30 MIN: CPT

## 2023-08-08 RX ORDER — MEMANTINE HYDROCHLORIDE 5 MG-10 MG
28 X 5 MG & KIT ORAL
Refills: 0 | Status: ACTIVE | COMMUNITY

## 2023-08-08 RX ORDER — CEFUROXIME AXETIL 250 MG/1
250 TABLET ORAL
Refills: 0 | Status: DISCONTINUED | COMMUNITY
End: 2023-08-08

## 2023-08-08 NOTE — DISCUSSION/SUMMARY
[FreeTextEntry1] : Viktoriya seems to be doing well from a cardiovascular perspective.  She does not have any important symptoms of concern.  She remains anticoagulated, and remains in rate controlled atrial fibrillation.  I reviewed her most recent blood work from November 2021.  This revealed an LDL cholesterol of 88.  Carotid ultrasound was performed in March 2022.  This revealed mild to moderate bilateral atherosclerosis.  Echo was performed in March 2022.  This revealed an ejection fraction of 60 to 65%, with moderate mitral regurgitation and mild aortic regurgitation.  She had mild pulmonary hypertension at that time. Echocardiography was again performed in March 2023.  This revealed a normal ejection fraction with mild to moderate mitral regurgitation, mild aortic regurgitation and borderline pulmonary hypertension.  She does not have any symptoms of concern at this time.  She has had no falls or bleeding.  We will keep an eye on that.  She will see me in 6 months. I would consider another echo and carotid ultrasound at that time.

## 2023-08-08 NOTE — HISTORY OF PRESENT ILLNESS
[FreeTextEntry1] : I saw Viktoriya Ortiz in the office today for cardiac followup.  She was last seen in the office about 6 months ago.  She is now 88 years old, with a history of hypertension, hyperlipidemia and history of self-limited SVT.  She has been relatively intolerant to statin drugs because of myalgias.  She has a history of mild valvular heart disease.  She has mild to moderate carotid plaque.  She has had paroxysmal atrial fibrillation, on Eliquis.  Aspirin was added because of recurrent events, and eventually stopped.  She has mild dementia, and is on memantine.  She continues to live alone.  She presents to the office today having been feeling well.  She reports no chest discomfort or shortness of breath suggestive of angina.  She denies orthopnea, PND and lower extremity edema.  She denies palpitations, dizziness and syncope.  She reports that her blood pressure and cholesterol have been well controlled.

## 2023-08-08 NOTE — REVIEW OF SYSTEMS
[Joint Pain] : joint pain [Finger Pain] : pain in the finger [Ankle Swelling] : ankle swelling [Negative] : Genitourinary [Rash] : no rash [Dizziness] : no dizziness [Depression] : no depression [Easy Bleeding] : no tendency for easy bleeding [Easy Bruising] : no tendency for easy bruising [FreeTextEntry4] : sinus pressure [FreeTextEntry8] : incontinence [de-identified] : anxiety

## 2023-12-05 NOTE — REASON FOR VISIT
[Follow-Up - Clinic] : a clinic follow-up of [Carotid Artery Stenosis] : carotid stenosis [Hyperlipidemia] : hyperlipidemia [Hypertension] : hypertension [Palpitations] : palpitations [Supraventricular Tachycardia] : supraventricular tachycardia Never [FreeTextEntry1] : EDVINCs Parent

## 2024-02-27 ENCOUNTER — APPOINTMENT (OUTPATIENT)
Dept: CARDIOLOGY | Facility: CLINIC | Age: 89
End: 2024-02-27

## 2024-02-27 ENCOUNTER — APPOINTMENT (OUTPATIENT)
Dept: CARDIOLOGY | Facility: CLINIC | Age: 89
End: 2024-02-27
Payer: MEDICARE

## 2024-02-27 ENCOUNTER — NON-APPOINTMENT (OUTPATIENT)
Age: 89
End: 2024-02-27

## 2024-02-27 VITALS
DIASTOLIC BLOOD PRESSURE: 81 MMHG | HEART RATE: 99 BPM | BODY MASS INDEX: 26.87 KG/M2 | HEIGHT: 62 IN | SYSTOLIC BLOOD PRESSURE: 167 MMHG | OXYGEN SATURATION: 95 % | WEIGHT: 146 LBS

## 2024-02-27 VITALS — DIASTOLIC BLOOD PRESSURE: 80 MMHG | SYSTOLIC BLOOD PRESSURE: 148 MMHG

## 2024-02-27 DIAGNOSIS — I48.20 CHRONIC ATRIAL FIBRILLATION, UNSP: ICD-10-CM

## 2024-02-27 DIAGNOSIS — I10 ESSENTIAL (PRIMARY) HYPERTENSION: ICD-10-CM

## 2024-02-27 DIAGNOSIS — I65.29 OCCLUSION AND STENOSIS OF UNSPECIFIED CAROTID ARTERY: ICD-10-CM

## 2024-02-27 PROCEDURE — 99214 OFFICE O/P EST MOD 30 MIN: CPT

## 2024-02-27 PROCEDURE — 93000 ELECTROCARDIOGRAM COMPLETE: CPT

## 2024-02-27 RX ORDER — DILTIAZEM HYDROCHLORIDE 120 MG/1
120 CAPSULE, EXTENDED RELEASE ORAL
Qty: 90 | Refills: 3 | Status: ACTIVE | COMMUNITY
Start: 2019-03-27 | End: 1900-01-01

## 2024-02-27 RX ORDER — BUDESONIDE AND FORMOTEROL FUMARATE DIHYDRATE 80; 4.5 UG/1; UG/1
80-4.5 AEROSOL RESPIRATORY (INHALATION)
Refills: 0 | Status: DISCONTINUED | COMMUNITY
End: 2024-02-27

## 2024-02-27 RX ORDER — PREDNISONE 5 MG/1
5 TABLET ORAL
Refills: 0 | Status: DISCONTINUED | COMMUNITY
End: 2024-02-27

## 2024-02-27 RX ORDER — APIXABAN 5 MG/1
5 TABLET, FILM COATED ORAL TWICE DAILY
Qty: 180 | Refills: 3 | Status: ACTIVE | COMMUNITY
Start: 1900-01-01 | End: 1900-01-01

## 2024-02-27 RX ORDER — LOSARTAN POTASSIUM 50 MG/1
50 TABLET, FILM COATED ORAL
Qty: 90 | Refills: 3 | Status: ACTIVE | COMMUNITY
Start: 2017-03-30 | End: 1900-01-01

## 2024-02-27 RX ORDER — ATORVASTATIN CALCIUM 10 MG/1
10 TABLET, FILM COATED ORAL
Qty: 90 | Refills: 3 | Status: DISCONTINUED | COMMUNITY
End: 2024-02-27

## 2024-02-27 NOTE — PHYSICAL EXAM
[General Appearance - Well Developed] : well developed [Normal Appearance] : normal appearance [Well Groomed] : well groomed [General Appearance - Well Nourished] : well nourished [No Deformities] : no deformities [General Appearance - In No Acute Distress] : no acute distress [Normal Conjunctiva] : the conjunctiva exhibited no abnormalities [Normal Oral Mucosa] : normal oral mucosa [Normal Jugular Venous A Waves Present] : normal jugular venous A waves present [Normal Jugular Venous V Waves Present] : normal jugular venous V waves present [No Jugular Venous Chairez A Waves] : no jugular venous chairez A waves [] : no respiratory distress [Auscultation Breath Sounds / Voice Sounds] : lungs were clear to auscultation bilaterally [Exaggerated Use Of Accessory Muscles For Inspiration] : no accessory muscle use [Respiration, Rhythm And Depth] : normal respiratory rhythm and effort [Bowel Sounds] : normal bowel sounds [Abdomen Soft] : soft [Abdomen Tenderness] : non-tender [Abnormal Walk] : normal gait [Gait - Sufficient For Exercise Testing] : the gait was sufficient for exercise testing [Cyanosis, Localized] : no localized cyanosis [Nail Clubbing] : no clubbing of the fingernails [Skin Color & Pigmentation] : normal skin color and pigmentation [Skin Turgor] : normal skin turgor [Impaired Insight] : insight and judgment were intact [No Anxiety] : not feeling anxious [Oriented To Time, Place, And Person] : oriented to person, place, and time [Normal] : normal [No Precordial Heave] : no precordial heave was noted [Normal Rate] : normal [Normal S1] : normal S1 [Normal S2] : normal S2 [No Murmur] : no murmurs heard [2+] : left 2+ [No Abnormalities] : the abdominal aorta was not enlarged and no bruit was heard [No Pitting Edema] : no pitting edema present

## 2024-02-27 NOTE — REASON FOR VISIT
[Coronary Artery Disease] : coronary artery disease [Carotid, Aortic and Peripheral Vascular Disease] : carotid, aortic and peripheral vascular disease [Follow-Up - Clinic] : a clinic follow-up of [Carotid Artery Stenosis] : carotid stenosis [Hyperlipidemia] : hyperlipidemia [Hypertension] : hypertension [Palpitations] : palpitations [Supraventricular Tachycardia] : supraventricular tachycardia

## 2024-02-27 NOTE — REVIEW OF SYSTEMS
[Joint Pain] : joint pain [Finger Pain] : pain in the finger [Ankle Swelling] : ankle swelling [Negative] : Respiratory

## 2024-09-05 ENCOUNTER — NON-APPOINTMENT (OUTPATIENT)
Age: 89
End: 2024-09-05

## 2024-09-05 ENCOUNTER — APPOINTMENT (OUTPATIENT)
Dept: CARDIOLOGY | Facility: CLINIC | Age: 89
End: 2024-09-05
Payer: MEDICARE

## 2024-09-05 VITALS
SYSTOLIC BLOOD PRESSURE: 153 MMHG | DIASTOLIC BLOOD PRESSURE: 79 MMHG | HEART RATE: 81 BPM | HEIGHT: 62 IN | BODY MASS INDEX: 26.5 KG/M2 | WEIGHT: 144 LBS | OXYGEN SATURATION: 95 %

## 2024-09-05 VITALS — SYSTOLIC BLOOD PRESSURE: 145 MMHG | DIASTOLIC BLOOD PRESSURE: 70 MMHG

## 2024-09-05 DIAGNOSIS — I48.20 CHRONIC ATRIAL FIBRILLATION, UNSP: ICD-10-CM

## 2024-09-05 PROCEDURE — 93000 ELECTROCARDIOGRAM COMPLETE: CPT

## 2024-09-05 PROCEDURE — G2211 COMPLEX E/M VISIT ADD ON: CPT

## 2024-09-05 PROCEDURE — 99214 OFFICE O/P EST MOD 30 MIN: CPT

## 2024-09-05 NOTE — REASON FOR VISIT
[Coronary Artery Disease] : coronary artery disease [Carotid, Aortic and Peripheral Vascular Disease] : carotid, aortic and peripheral vascular disease [Follow-Up - Clinic] : a clinic follow-up of [Carotid Artery Stenosis] : carotid stenosis [Hyperlipidemia] : hyperlipidemia [Hypertension] : hypertension [Palpitations] : palpitations [Supraventricular Tachycardia] : supraventricular tachycardia [FreeTextEntry1] : EDVINCs

## 2024-09-05 NOTE — CARDIOLOGY SUMMARY
[No Ischemia] : no Ischemia [___] : [unfilled] [LVEF ___%] : LVEF [unfilled]% [___] : [unfilled] [de-identified] : atrial fibrillation [de-identified] : 3/2023: CONCLUSIONS: 1. Technically difficult image quality. 2. The left ventricular systolic function is normal with an ejection fraction visually estimated at 60 to 65 %. There are no regional wall motion abnormalities seen. 3. Normal left ventricular mass and size. 4. Normal right ventricular size and normal systolic function. 5. The left atrium is moderately dilated in size. 6. The right atrial is moderately dilated in size. 7. There is mild calcification of the mitral valve annulus. 8. Mild mitral valve leaflet calcification. 9. Mild to moderate mitral regurgitation. 10. Mild calcification of the aortic valve leaflets. 11. Mild aortic regurgitation. 12. Estimated pulmonary artery systolic pressure is 36 mmHg, consistent with there is borderline pulmonary hypertension. 13. Compared to the transthoracic echocardiogram performed on 3/28/2022,. PASP is lower, now in the borderline range.

## 2024-09-05 NOTE — REVIEW OF SYSTEMS
[Joint Pain] : joint pain [Finger Pain] : pain in the finger [Ankle Swelling] : ankle swelling [Negative] : Genitourinary [Rash] : no rash [Dizziness] : no dizziness [Depression] : no depression [Easy Bleeding] : no tendency for easy bleeding [Easy Bruising] : no tendency for easy bruising [FreeTextEntry9] : ankle [de-identified] : anxiety

## 2024-09-05 NOTE — DISCUSSION/SUMMARY
[FreeTextEntry1] : Viktoriya seems to be doing well from a cardiovascular perspective.  She does not have any important symptoms of concern.  She is euvolemic on exam.    Her blood pressure is mildly elevated but not offensive. She will continue losartan 50mg daily.  Carotid ultrasound was performed in March 2022.  This revealed mild to moderate bilateral atherosclerosis.  Echocardiography was again performed in March 2023.  This revealed a normal ejection fraction with mild to moderate mitral regurgitation, mild aortic regurgitation and borderline pulmonary hypertension.  ECG illustrates rate controlled Afib. She will continue metoprolol 50mg BID and diltiazem 120mg daily  She does not have any symptoms of concern at this time.  She has had no falls or bleeding.  We will keep an eye on that. She can remain off statin therapy for now (due to concerns for memory).  She will schedule a follow-up echocardiogram to reassess the degree of mitral regurgitation.  She will see me in 6 months. [EKG obtained to assist in diagnosis and management of assessed problem(s)] : EKG obtained to assist in diagnosis and management of assessed problem(s)

## 2024-09-05 NOTE — PHYSICAL EXAM
[General Appearance - Well Developed] : well developed [Normal Appearance] : normal appearance [Well Groomed] : well groomed [General Appearance - Well Nourished] : well nourished [No Deformities] : no deformities [General Appearance - In No Acute Distress] : no acute distress [Normal Oral Mucosa] : normal oral mucosa [Normal Jugular Venous A Waves Present] : normal jugular venous A waves present [Normal Jugular Venous V Waves Present] : normal jugular venous V waves present [No Jugular Venous Chairez A Waves] : no jugular venous chairez A waves [] : no respiratory distress [Respiration, Rhythm And Depth] : normal respiratory rhythm and effort [Exaggerated Use Of Accessory Muscles For Inspiration] : no accessory muscle use [Auscultation Breath Sounds / Voice Sounds] : lungs were clear to auscultation bilaterally [Bowel Sounds] : normal bowel sounds [Abdomen Soft] : soft [Abdomen Tenderness] : non-tender [Abnormal Walk] : normal gait [Gait - Sufficient For Exercise Testing] : the gait was sufficient for exercise testing [Nail Clubbing] : no clubbing of the fingernails [Cyanosis, Localized] : no localized cyanosis [Skin Color & Pigmentation] : normal skin color and pigmentation [Skin Turgor] : normal skin turgor [Oriented To Time, Place, And Person] : oriented to person, place, and time [Impaired Insight] : insight and judgment were intact [No Anxiety] : not feeling anxious [Normal] : normal [No Precordial Heave] : no precordial heave was noted [Normal Rate] : normal [2+] : left 2+ [Well Developed] : well developed [Well Nourished] : well nourished [No Acute Distress] : no acute distress [Normal Conjunctiva] : normal conjunctiva [Normal Venous Pressure] : normal venous pressure [No Carotid Bruit] : no carotid bruit [Normal S1, S2] : normal S1, S2 [No Rub] : no rub [No Gallop] : no gallop [Rhythm Regular] : regular [Normal S1] : normal S1 [Normal S2] : normal S2 [No Murmur] : no murmurs heard [No Pitting Edema] : no pitting edema present [No Abnormalities] : the abdominal aorta was not enlarged and no bruit was heard [Clear Lung Fields] : clear lung fields [Good Air Entry] : good air entry [No Respiratory Distress] : no respiratory distress  [Soft] : abdomen soft [Non Tender] : non-tender [No Masses/organomegaly] : no masses/organomegaly [Normal Bowel Sounds] : normal bowel sounds [Normal Gait] : normal gait [No Edema] : no edema [No Cyanosis] : no cyanosis [No Clubbing] : no clubbing [No Varicosities] : no varicosities [No Rash] : no rash [No Skin Lesions] : no skin lesions [Moves all extremities] : moves all extremities [No Focal Deficits] : no focal deficits [Normal Speech] : normal speech [Alert and Oriented] : alert and oriented [Normal memory] : normal memory [Apical Thrill] : no thrill palpable at the apex [S3] : no S3 [S4] : no S4 [Right Femoral Bruit] : no bruit heard over the right femoral artery [Left Femoral Bruit] : no bruit heard over the left femoral artery [Right Carotid Bruit] : no bruit heard over the right carotid [Left Carotid Bruit] : no bruit heard over the left carotid

## 2024-09-05 NOTE — HISTORY OF PRESENT ILLNESS
[FreeTextEntry1] : I saw Viktoriya Ortiz in the office today for cardiac follow-up.  She was last seen in the office about 6 months ago.  She is now 89 years old, with a history of hypertension, hyperlipidemia and history of self-limited SVT.  She has been relatively intolerant to statin drugs because of myalgias.  She has a history of mild valvular heart disease.  She has mild to moderate carotid plaque.  She has had paroxysmal atrial fibrillation, on Eliquis.  Aspirin was added because of recurrent events, and eventually stopped.  She has mild dementia, and is on memantine.  She continues to live alone.  She presents to the office today having been feeling well.  She reports no chest discomfort or shortness of breath suggestive of angina.  She denies orthopnea, PND and lower extremity edema.  She denies palpitations, dizziness and syncope. She tries to remain very active.  She dances around in her home every morning for 10 minutes.  She has had no falls, and she has had no bleeding issues.

## 2024-09-16 ENCOUNTER — APPOINTMENT (OUTPATIENT)
Dept: CARDIOLOGY | Facility: CLINIC | Age: 89
End: 2024-09-16
Payer: MEDICARE

## 2024-09-16 PROCEDURE — 93306 TTE W/DOPPLER COMPLETE: CPT

## 2025-02-18 ENCOUNTER — RX RENEWAL (OUTPATIENT)
Age: 89
End: 2025-02-18

## 2025-03-03 ENCOUNTER — NON-APPOINTMENT (OUTPATIENT)
Age: 89
End: 2025-03-03

## 2025-03-03 ENCOUNTER — APPOINTMENT (OUTPATIENT)
Dept: CARDIOLOGY | Facility: CLINIC | Age: 89
End: 2025-03-03
Payer: MEDICARE

## 2025-03-03 VITALS
OXYGEN SATURATION: 95 % | BODY MASS INDEX: 25.76 KG/M2 | SYSTOLIC BLOOD PRESSURE: 149 MMHG | HEIGHT: 62 IN | WEIGHT: 140 LBS | HEART RATE: 66 BPM | DIASTOLIC BLOOD PRESSURE: 62 MMHG

## 2025-03-03 DIAGNOSIS — I48.20 CHRONIC ATRIAL FIBRILLATION, UNSP: ICD-10-CM

## 2025-03-03 DIAGNOSIS — I48.0 PAROXYSMAL ATRIAL FIBRILLATION: ICD-10-CM

## 2025-03-03 PROCEDURE — 99214 OFFICE O/P EST MOD 30 MIN: CPT

## 2025-03-03 PROCEDURE — 93000 ELECTROCARDIOGRAM COMPLETE: CPT

## 2025-04-16 ENCOUNTER — RX RENEWAL (OUTPATIENT)
Age: 89
End: 2025-04-16